# Patient Record
Sex: MALE | URBAN - METROPOLITAN AREA
[De-identification: names, ages, dates, MRNs, and addresses within clinical notes are randomized per-mention and may not be internally consistent; named-entity substitution may affect disease eponyms.]

---

## 2017-06-27 ENCOUNTER — APPOINTMENT (RX ONLY)
Dept: URBAN - METROPOLITAN AREA CLINIC 45 | Facility: CLINIC | Age: 62
Setting detail: DERMATOLOGY
End: 2017-06-27

## 2017-06-27 PROBLEM — E13.9 OTHER SPECIFIED DIABETES MELLITUS WITHOUT COMPLICATIONS: Status: ACTIVE | Noted: 2017-06-27

## 2017-06-27 PROBLEM — E03.9 HYPOTHYROIDISM, UNSPECIFIED: Status: ACTIVE | Noted: 2017-06-27

## 2017-06-27 PROBLEM — Z85.828 PERSONAL HISTORY OF OTHER MALIGNANT NEOPLASM OF SKIN: Status: ACTIVE | Noted: 2017-06-27

## 2017-06-27 PROBLEM — C44.42 SQUAMOUS CELL CARCINOMA OF SKIN OF SCALP AND NECK: Status: ACTIVE | Noted: 2017-06-27

## 2017-06-27 PROCEDURE — ? CONSULTATION FOR MOHS SURGERY

## 2017-06-27 PROCEDURE — 99203 OFFICE O/P NEW LOW 30 MIN: CPT

## 2017-06-27 NOTE — PROCEDURE: CONSULTATION FOR MOHS SURGERY
Body Location Override (Optional - Billing Will Still Be Based On Selected Body Map Location If Applicable): Top Scalp
Detail Level: Detailed
X Size Of Lesion In Cm (Optional): 0
Name Of The Referring Provider For Procedure: Dr. Call
Incorporate Mauc In Note: No

## 2017-06-27 NOTE — HPI: MOHS SURGERY CONSULTATION
Has The Cancer Been Biopsied Before?: has been previously biopsied
Who Is Your Referring Provider?: Dr. Call
When Was Your Biopsy?: 06/05/2017
Year Removed: 1900
Body Location Override (Optional): Top Scalp

## 2017-07-19 ENCOUNTER — APPOINTMENT (RX ONLY)
Dept: URBAN - METROPOLITAN AREA CLINIC 45 | Facility: CLINIC | Age: 62
Setting detail: DERMATOLOGY
End: 2017-07-19

## 2017-07-19 PROBLEM — C44.42 SQUAMOUS CELL CARCINOMA OF SKIN OF SCALP AND NECK: Status: ACTIVE | Noted: 2017-07-19

## 2017-07-19 PROCEDURE — 17311 MOHS 1 STAGE H/N/HF/G: CPT

## 2017-07-19 PROCEDURE — ? MOHS SURGERY

## 2017-07-19 PROCEDURE — 12032 INTMD RPR S/A/T/EXT 2.6-7.5: CPT

## 2017-07-19 PROCEDURE — ? PRESCRIPTION

## 2017-07-19 RX ORDER — CEPHALEXIN 500 MG/1
CAPSULE ORAL
Qty: 15 | Refills: 0 | Status: ERX | COMMUNITY
Start: 2017-07-19

## 2017-07-19 RX ADMIN — CEPHALEXIN: 500 CAPSULE ORAL at 17:26

## 2017-07-19 NOTE — PROCEDURE: MOHS SURGERY
Tarsorrhaphy Performed?: No
Helical Rim Advancement Flap Text: The defect edges were debeveled with a #15 blade scalpel.  Given the location of the defect and the proximity to free margins (helical rim) a double helical rim advancement flap was deemed most appropriate.  Using a sterile surgical marker, the appropriate advancement flaps were drawn incorporating the defect and placing the expected incisions between the helical rim and antihelix where possible.  The area thus outlined was incised through and through with a #15 scalpel blade.  With a skin hook and iris scissors, the flaps were gently and sharply undermined and freed up.
Show Surgical Defect Variables In The Stage Tabs: Yes
Z Plasty Text: The lesion was extirpated to the level of the fat with a #15 scalpel blade.  Given the location of the defect, shape of the defect and the proximity to free margins a Z-plasty was deemed most appropriate for repair.  Using a sterile surgical marker, the appropriate transposition arms of the Z-plasty were drawn incorporating the defect and placing the expected incisions within the relaxed skin tension lines where possible.    The area thus outlined was incised deep to adipose tissue with a #15 scalpel blade.  The skin margins were undermined to an appropriate distance in all directions utilizing iris scissors.  The opposing transposition arms were then transposed into place in opposite direction and anchored with interrupted buried subcutaneous sutures.
Anesthesia Volume In Cc: 9
Stage 10: Additional Anesthesia Volume In Cc: 0
Cartilage Graft Text: The defect edges were debeveled with a #15 scalpel blade.  Given the location of the defect, shape of the defect, the fact the defect involved a full thickness cartilage defect a cartilage graft was deemed most appropriate.  An appropriate donor site was identified, cleansed, and anesthetized. The cartilage graft was then harvested and transferred to the recipient site, oriented appropriately and then sutured into place.  The secondary defect was then repaired using a primary closure.
Intermediate Repair Preamble Text (Leave Blank If You Do Not Want): Undermining was performed with blunt dissection.
Posterior Auricular Interpolation Flap Text: A decision was made to reconstruct the defect utilizing an interpolation axial flap and a staged reconstruction.  A telfa template was made of the defect.  This telfa template was then used to outline the posterior auricular interpolation flap.  The donor area for the pedicle flap was then injected with anesthesia.  The flap was excised through the skin and subcutaneous tissue down to the layer of the underlying musculature.  The pedicle flap was carefully excised within this deep plane to maintain its blood supply.  The edges of the donor site were undermined.   The donor site was closed in a primary fashion.  The pedicle was then rotated into position and sutured.  Once the tube was sutured into place, adequate blood supply was confirmed with blanching and refill.  The pedicle was then wrapped with xeroform gauze and dressed appropriately with a telfa and gauze bandage to ensure continued blood supply and protect the attached pedicle.
Spiral Flap Text: The defect edges were debeveled with a #15 scalpel blade.  Given the location of the defect, shape of the defect and the proximity to free margins a spiral flap was deemed most appropriate.  Using a sterile surgical marker, an appropriate rotation flap was drawn incorporating the defect and placing the expected incisions within the relaxed skin tension lines where possible. The area thus outlined was incised deep to adipose tissue with a #15 scalpel blade.  The skin margins were undermined to an appropriate distance in all directions utilizing iris scissors.
Tarsorrhaphy Text: A tarsorrhaphy was performed using Frost sutures.
Consent (Nose)/Introductory Paragraph: The rationale for Mohs was explained to the patient and consent was obtained. The risks, benefits and alternatives to therapy were discussed in detail. Specifically, the risks of nasal deformity, changes in the flow of air through the nose, infection, scarring, bleeding, prolonged wound healing, incomplete removal, allergy to anesthesia, nerve injury and recurrence were addressed. Prior to the procedure, the treatment site was clearly identified and confirmed by the patient. All components of Universal Protocol/PAUSE Rule completed.
Referred To Otolaryngology For Closure Text (Leave Blank If You Do Not Want): After obtaining clear surgical margins the patient was sent to otolaryngology for surgical repair.  The patient understands they will receive post-surgical care and follow-up from the referring physician's office.
Repair Type: Intermediate Layered Repair
Dermal Autograft Text: The defect edges were debeveled with a #15 scalpel blade.  Given the location of the defect, shape of the defect and the proximity to free margins a dermal autograft was deemed most appropriate.  Using a sterile surgical marker, the primary defect shape was transferred to the donor site. The area thus outlined was incised deep to adipose tissue with a #15 scalpel blade.  The harvested graft was then trimmed of adipose and epidermal tissue until only dermis was left.  The skin graft was then placed in the primary defect and oriented appropriately.
Bilobed Flap Text: The defect edges were debeveled with a #15 scalpel blade.  Given the location of the defect and the proximity to free margins a bilobe flap was deemed most appropriate.  Using a sterile surgical marker, an appropriate bilobe flap drawn around the defect.    The area thus outlined was incised deep to adipose tissue with a #15 scalpel blade.  The skin margins were undermined to an appropriate distance in all directions utilizing iris scissors.
Island Pedicle Flap-Requiring Vessel Identification Text: The defect edges were debeveled with a #15 scalpel blade.  Given the location of the defect, shape of the defect and the proximity to free margins an island pedicle advancement flap was deemed most appropriate.  Using a sterile surgical marker, an appropriate advancement flap was drawn, based on the axial vessel mentioned above, incorporating the defect, outlining the appropriate donor tissue and placing the expected incisions within the relaxed skin tension lines where possible.    The area thus outlined was incised deep to adipose tissue with a #15 scalpel blade.  The skin margins were undermined to an appropriate distance in all directions around the primary defect and laterally outward around the island pedicle utilizing iris scissors.  There was minimal undermining beneath the pedicle flap.
Home Suture Removal Text: Patient was provided instructions on removing sutures and will remove their sutures at home.  If they have any questions or difficulties they will call the office.
Consent 1/Introductory Paragraph: The rationale for Mohs was explained to the patient and consent was obtained. The risks, benefits and alternatives to therapy were discussed in detail. Specifically, the risks of infection, scarring, bleeding, prolonged wound healing, incomplete removal, allergy to anesthesia, nerve injury and recurrence were addressed. Prior to the procedure, the treatment site was clearly identified and confirmed by the patient. All components of Universal Protocol/PAUSE Rule completed.
Ear Star Wedge Flap Text: The defect edges were debeveled with a #15 blade scalpel.  Given the location of the defect and the proximity to free margins (helical rim) an ear star wedge flap was deemed most appropriate.  Using a sterile surgical marker, the appropriate flap was drawn incorporating the defect and placing the expected incisions between the helical rim and antihelix where possible.  The area thus outlined was incised through and through with a #15 scalpel blade.
Stage 4: Additional Anesthesia Type: 1% lidocaine with epinephrine
Consent (Spinal Accessory)/Introductory Paragraph: The rationale for Mohs was explained to the patient and consent was obtained. The risks, benefits and alternatives to therapy were discussed in detail. Specifically, the risks of damage to the spinal accessory nerve, infection, scarring, bleeding, prolonged wound healing, incomplete removal, allergy to anesthesia, and recurrence were addressed. Prior to the procedure, the treatment site was clearly identified and confirmed by the patient. All components of Universal Protocol/PAUSE Rule completed.
Estimated Blood Loss (Cc): minimal
V-Y Plasty Text: The defect edges were debeveled with a #15 scalpel blade.  Given the location of the defect, shape of the defect and the proximity to free margins an V-Y advancement flap was deemed most appropriate.  Using a sterile surgical marker, an appropriate advancement flap was drawn incorporating the defect and placing the expected incisions within the relaxed skin tension lines where possible.    The area thus outlined was incised deep to adipose tissue with a #15 scalpel blade.  The skin margins were undermined to an appropriate distance in all directions utilizing iris scissors.
Closure 4 Information: This tab is for additional flaps and grafts above and beyond our usual structured repairs.  Please note if you enter information here it will not currently bill and you will need to add the billing information manually.
Rhombic Flap Text: The defect edges were debeveled with a #15 scalpel blade.  Given the location of the defect and the proximity to free margins a rhombic flap was deemed most appropriate.  Using a sterile surgical marker, an appropriate rhombic flap was drawn incorporating the defect.    The area thus outlined was incised deep to adipose tissue with a #15 scalpel blade.  The skin margins were undermined to an appropriate distance in all directions utilizing iris scissors.
Wound Care: Aquaphor
Epidermal Sutures: 4-0 Nylon
Simple / Intermediate / Complex Repair - Final Wound Length In Cm: 5
Closure 2 Information: This tab is for additional flaps and grafts, including complex repair and grafts and complex repair and flaps. You can also specify a different location for the additional defect, if the location is the same you do not need to select a new one. We will insert the automated text for the repair you select below just as we do for solitary flaps and grafts. Please note that at this time if you select a location with a different insurance zone you will need to override the ICD10 and CPT if appropriate.
Purse String (Intermediate) Text: Given the location of the defect and the characteristics of the surrounding skin a pursestring intermediate closure was deemed most appropriate.  Undermining was performed circumfirentially around the surgical defect.  A purstring suture was then placed and tightened.
Mohs Histo Method Verbiage: Each section was then chromacoded and processed in the Mohs lab using the Mohs protocol and submitted for frozen section.
Initial Size Of Lesion: 1.7
Trilobed Flap Text: The defect edges were debeveled with a #15 scalpel blade.  Given the location of the defect and the proximity to free margins a trilobed flap was deemed most appropriate.  Using a sterile surgical marker, an appropriate trilobed flap drawn around the defect.    The area thus outlined was incised deep to adipose tissue with a #15 scalpel blade.  The skin margins were undermined to an appropriate distance in all directions utilizing iris scissors.
Cheiloplasty (Complex) Text: A decision was made to reconstruct the defect with a  cheiloplasty.  The defect was undermined extensively.  Additional obicularis oris muscle was excised with a 15 blade scalpel.  The defect was converted into a full thickness wedge to facilite a better cosmetic result.  Small vessels were then tied off with 5-0 monocyrl. The obicularis oris, superficial fascia, adipose and dermis were then reapproximated.  After the deeper layers were approximated the epidermis was reapproximated with particular care given to realign the vermillion border.
O-L Flap Text: The defect edges were debeveled with a #15 scalpel blade.  Given the location of the defect, shape of the defect and the proximity to free margins an O-L flap was deemed most appropriate.  Using a sterile surgical marker, an appropriate advancement flap was drawn incorporating the defect and placing the expected incisions within the relaxed skin tension lines where possible.    The area thus outlined was incised deep to adipose tissue with a #15 scalpel blade.  The skin margins were undermined to an appropriate distance in all directions utilizing iris scissors.
Body Location Override (Optional - Billing Will Still Be Based On Selected Body Map Location If Applicable): top scalp
Consent (Ear)/Introductory Paragraph: The rationale for Mohs was explained to the patient and consent was obtained. The risks, benefits and alternatives to therapy were discussed in detail. Specifically, the risks of ear deformity, infection, scarring, bleeding, prolonged wound healing, incomplete removal, allergy to anesthesia, nerve injury and recurrence were addressed. Prior to the procedure, the treatment site was clearly identified and confirmed by the patient. All components of Universal Protocol/PAUSE Rule completed.
Ear Wedge Repair Text: A wedge excision was completed by carrying down an excision through the full thickness of the ear and cartilage with an inward facing Burow's triangle. The wound was then closed in a layered fashion.
Alar Island Pedicle Flap Text: The defect edges were debeveled with a #15 scalpel blade.  Given the location of the defect, shape of the defect and the proximity to the alar rim an island pedicle advancement flap was deemed most appropriate.  Using a sterile surgical marker, an appropriate advancement flap was drawn incorporating the defect, outlining the appropriate donor tissue and placing the expected incisions within the nasal ala running parallel to the alar rim. The area thus outlined was incised with a #15 scalpel blade.  The skin margins were undermined minimally to an appropriate distance in all directions around the primary defect and laterally outward around the island pedicle utilizing iris scissors.  There was minimal undermining beneath the pedicle flap.
V-Y Flap Text: The defect edges were debeveled with a #15 scalpel blade.  Given the location of the defect, shape of the defect and the proximity to free margins a V-Y flap was deemed most appropriate.  Using a sterile surgical marker, an appropriate advancement flap was drawn incorporating the defect and placing the expected incisions within the relaxed skin tension lines where possible.    The area thus outlined was incised deep to adipose tissue with a #15 scalpel blade.  The skin margins were undermined to an appropriate distance in all directions utilizing iris scissors.
Complex Repair And Graft Additional Text (Will Appearing After The Standard Complex Repair Text): The complex repair was not sufficient to completely close the primary defect. The remaining additional defect was repaired with the graft mentioned below.
Melolabial Interpolation Flap Text: A decision was made to reconstruct the defect utilizing an interpolation axial flap and a staged reconstruction.  A telfa template was made of the defect.  This telfa template was then used to outline the melolabial interpolation flap.  The donor area for the pedicle flap was then injected with anesthesia.  The flap was excised through the skin and subcutaneous tissue down to the layer of the underlying musculature.  The pedicle flap was carefully excised within this deep plane to maintain its blood supply.  The edges of the donor site were undermined.   The donor site was closed in a primary fashion.  The pedicle was then rotated into position and sutured.  Once the tube was sutured into place, adequate blood supply was confirmed with blanching and refill.  The pedicle was then wrapped with xeroform gauze and dressed appropriately with a telfa and gauze bandage to ensure continued blood supply and protect the attached pedicle.
Cheek-To-Nose Interpolation Flap Text: A decision was made to reconstruct the defect utilizing an interpolation axial flap and a staged reconstruction.  A telfa template was made of the defect.  This telfa template was then used to outline the Cheek-To-Nose Interpolation flap.  The donor area for the pedicle flap was then injected with anesthesia.  The flap was excised through the skin and subcutaneous tissue down to the layer of the underlying musculature.  The interpolation flap was carefully excised within this deep plane to maintain its blood supply.  The edges of the donor site were undermined.   The donor site was closed in a primary fashion.  The pedicle was then rotated into position and sutured.  Once the tube was sutured into place, adequate blood supply was confirmed with blanching and refill.  The pedicle was then wrapped with xeroform gauze and dressed appropriately with a telfa and gauze bandage to ensure continued blood supply and protect the attached pedicle.
Mohs Method Verbiage: A beveled incision following the standard Mohs approach was done and the specimen was harvested as a microscopic controlled layer.
Area M Indication Text: Tumors in this location are included in Area M (cheek, forehead, scalp, neck, jawline and pretibial skin).  Mohs surgery is indicated for tumors in these anatomic locations.
Advancement Flap (Single) Text: The defect edges were debeveled with a #15 scalpel blade.  Given the location of the defect and the proximity to free margins a single advancement flap was deemed most appropriate.  Using a sterile surgical marker, an appropriate advancement flap was drawn incorporating the defect and placing the expected incisions within the relaxed skin tension lines where possible.    The area thus outlined was incised deep to adipose tissue with a #15 scalpel blade.  The skin margins were undermined to an appropriate distance in all directions utilizing iris scissors.
Tissue Cultured Epidermal Autograft Text: The defect edges were debeveled with a #15 scalpel blade.  Given the location of the defect, shape of the defect and the proximity to free margins a tissue cultured epidermal autograft was deemed most appropriate.  The graft was then trimmed to fit the size of the defect.  The graft was then placed in the primary defect and oriented appropriately.
Lazy S Complex Repair Preamble Text (Leave Blank If You Do Not Want): Extensive wide undermining was performed.
Partial Purse String (Intermediate) Text: Given the location of the defect and the characteristics of the surrounding skin an intermediate purse string closure was deemed most appropriate.  Undermining was performed circumfirentially around the surgical defect.  A purse string suture was then placed and tightened. Wound tension only allowed a partial closure of the circular defect.
Melolabial Transposition Flap Text: The defect edges were debeveled with a #15 scalpel blade.  Given the location of the defect and the proximity to free margins a melolabial flap was deemed most appropriate.  Using a sterile surgical marker, an appropriate melolabial transposition flap was drawn incorporating the defect.    The area thus outlined was incised deep to adipose tissue with a #15 scalpel blade.  The skin margins were undermined to an appropriate distance in all directions utilizing iris scissors.
Epidermal Closure: running horizontal mattress
Composite Graft Text: The defect edges were debeveled with a #15 scalpel blade.  Given the location of the defect, shape of the defect, the proximity to free margins and the fact the defect was full thickness a composite graft was deemed most appropriate.  The defect was outline and then transferred to the donor site.  A full thickness graft was then excised from the donor site. The graft was then placed in the primary defect, oriented appropriately and then sutured into place.  The secondary defect was then repaired using a primary closure.
Referred To Mid-Level For Closure Text (Leave Blank If You Do Not Want): After obtaining clear surgical margins the patient was sent to a mid-level provider for surgical repair.  The patient understands they will receive post-surgical care and follow-up from the mid-level provider.
Consent 3/Introductory Paragraph: I gave the patient a chance to ask questions they had about the procedure.  Following this I explained the Mohs procedure and consent was obtained. The risks, benefits and alternatives to therapy were discussed in detail. Specifically, the risks of infection, scarring, bleeding, prolonged wound healing, incomplete removal, allergy to anesthesia, nerve injury and recurrence were addressed. Prior to the procedure, the treatment site was clearly identified and confirmed by the patient. All components of Universal Protocol/PAUSE Rule completed.
Closure 3 Information: This tab is for additional flaps and grafts above and beyond our usual structured repairs.  Please note if you enter information here it will not currently bill and you will need to add the billing information manually.
O-Z Plasty Text: The defect edges were debeveled with a #15 scalpel blade.  Given the location of the defect, shape of the defect and the proximity to free margins an O-Z plasty (double transposition flap) was deemed most appropriate.  Using a sterile surgical marker, the appropriate transposition flaps were drawn incorporating the defect and placing the expected incisions within the relaxed skin tension lines where possible.    The area thus outlined was incised deep to adipose tissue with a #15 scalpel blade.  The skin margins were undermined to an appropriate distance in all directions utilizing iris scissors.  Hemostasis was achieved with electrocautery.  The flaps were then transposed into place, one clockwise and the other counterclockwise, and anchored with interrupted buried subcutaneous sutures.
Inflammation Suggestive Of Cancer Camouflage Histology Text: There was a dense lymphocytic infiltrate which prevented adequate histologic evaluation of adjacent structures.
Medical Necessity Statement: Based on my medical judgement, Mohs surgery is the most appropriate treatment for this cancer compared to other treatments.
Surgeon: Dr. Mcmillan
Consent (Lip)/Introductory Paragraph: The rationale for Mohs was explained to the patient and consent was obtained. The risks, benefits and alternatives to therapy were discussed in detail. Specifically, the risks of lip deformity, changes in the oral aperture, infection, scarring, bleeding, prolonged wound healing, incomplete removal, allergy to anesthesia, nerve injury and recurrence were addressed. Prior to the procedure, the treatment site was clearly identified and confirmed by the patient. All components of Universal Protocol/PAUSE Rule completed.
A-T Advancement Flap Text: The defect edges were debeveled with a #15 scalpel blade.  Given the location of the defect, shape of the defect and the proximity to free margins an A-T advancement flap was deemed most appropriate.  Using a sterile surgical marker, an appropriate advancement flap was drawn incorporating the defect and placing the expected incisions within the relaxed skin tension lines where possible.    The area thus outlined was incised deep to adipose tissue with a #15 scalpel blade.  The skin margins were undermined to an appropriate distance in all directions utilizing iris scissors.
W Plasty Text: The lesion was extirpated to the level of the fat with a #15 scalpel blade.  Given the location of the defect, shape of the defect and the proximity to free margins a W-plasty was deemed most appropriate for repair.  Using a sterile surgical marker, the appropriate transposition arms of the W-plasty were drawn incorporating the defect and placing the expected incisions within the relaxed skin tension lines where possible.    The area thus outlined was incised deep to adipose tissue with a #15 scalpel blade.  The skin margins were undermined to an appropriate distance in all directions utilizing iris scissors.  The opposing transposition arms were then transposed into place in opposite direction and anchored with interrupted buried subcutaneous sutures.
Unna Boot Text: An Unna boot was placed to help immobilize the limb and facilitate more rapid healing.
Island Pedicle Flap Text: The defect edges were debeveled with a #15 scalpel blade.  Given the location of the defect, shape of the defect and the proximity to free margins an island pedicle advancement flap was deemed most appropriate.  Using a sterile surgical marker, an appropriate advancement flap was drawn incorporating the defect, outlining the appropriate donor tissue and placing the expected incisions within the relaxed skin tension lines where possible.    The area thus outlined was incised deep to adipose tissue with a #15 scalpel blade.  The skin margins were undermined to an appropriate distance in all directions around the primary defect and laterally outward around the island pedicle utilizing iris scissors.  There was minimal undermining beneath the pedicle flap.
Burow's Advancement Flap Text: The defect edges were debeveled with a #15 scalpel blade.  Given the location of the defect and the proximity to free margins a Burow's advancement flap was deemed most appropriate.  Using a sterile surgical marker, the appropriate advancement flap was drawn incorporating the defect and placing the expected incisions within the relaxed skin tension lines where possible.    The area thus outlined was incised deep to adipose tissue with a #15 scalpel blade.  The skin margins were undermined to an appropriate distance in all directions utilizing iris scissors.
Mucosal Advancement Flap Text: Given the location of the defect, shape of the defect and the proximity to free margins a mucosal advancement flap was deemed most appropriate. Incisions were made with a 15 blade scalpel in the appropriate fashion along the cutaneous vermillion border and the mucosal lip. The remaining actinically damaged mucosal tissue was excised.  The mucosal advancement flap was then elevated to the gingival sulcus with care taken to preserve the neurovascular structures and advanced into the primary defect. Care was taken to ensure that precise realignment of the vermillion border was achieved.
Number Of Stages: 1
No Repair - Repaired With Adjacent Surgical Defect Text (Leave Blank If You Do Not Want): After obtaining clear surgical margins the defect was repaired concurrently with another surgical defect which was in close approximation.
Transposition Flap Text: The defect edges were debeveled with a #15 scalpel blade.  Given the location of the defect and the proximity to free margins a transposition flap was deemed most appropriate.  Using a sterile surgical marker, an appropriate transposition flap was drawn incorporating the defect.    The area thus outlined was incised deep to adipose tissue with a #15 scalpel blade.  The skin margins were undermined to an appropriate distance in all directions utilizing iris scissors.
Bcc Histology Text: There were numerous aggregates of basaloid cells.
Bilateral Helical Rim Advancement Flap Text: The defect edges were debeveled with a #15 blade scalpel.  Given the location of the defect and the proximity to free margins (helical rim) a bilateral helical rim advancement flap was deemed most appropriate.  Using a sterile surgical marker, the appropriate advancement flaps were drawn incorporating the defect and placing the expected incisions between the helical rim and antihelix where possible.  The area thus outlined was incised through and through with a #15 scalpel blade.  With a skin hook and iris scissors, the flaps were gently and sharply undermined and freed up.
Star Wedge Flap Text: The defect edges were debeveled with a #15 scalpel blade.  Given the location of the defect, shape of the defect and the proximity to free margins a star wedge flap was deemed most appropriate.  Using a sterile surgical marker, an appropriate rotation flap was drawn incorporating the defect and placing the expected incisions within the relaxed skin tension lines where possible. The area thus outlined was incised deep to adipose tissue with a #15 scalpel blade.  The skin margins were undermined to an appropriate distance in all directions utilizing iris scissors.
Repair Anesthesia Method: local infiltration
Alternatives Discussed Intro (Do Not Add Period): I discussed alternative treatments to Mohs surgery and specifically discussed the risks and benefits of
O-T Advancement Flap Text: The defect edges were debeveled with a #15 scalpel blade.  Given the location of the defect, shape of the defect and the proximity to free margins an O-T advancement flap was deemed most appropriate.  Using a sterile surgical marker, an appropriate advancement flap was drawn incorporating the defect and placing the expected incisions within the relaxed skin tension lines where possible.    The area thus outlined was incised deep to adipose tissue with a #15 scalpel blade.  The skin margins were undermined to an appropriate distance in all directions utilizing iris scissors.
Rotation Flap Text: The defect edges were debeveled with a #15 scalpel blade.  Given the location of the defect, shape of the defect and the proximity to free margins a rotation flap was deemed most appropriate.  Using a sterile surgical marker, an appropriate rotation flap was drawn incorporating the defect and placing the expected incisions within the relaxed skin tension lines where possible.    The area thus outlined was incised deep to adipose tissue with a #15 scalpel blade.  The skin margins were undermined to an appropriate distance in all directions utilizing iris scissors.
Graft Donor Site Bandage (Optional-Leave Blank If You Don't Want In Note): Steri-strips and a pressure bandage were applied to the donor site.
Referred To Plastics For Closure Text (Leave Blank If You Do Not Want): After obtaining clear surgical margins the patient was sent to Dr. Denny for surgical repair.
Subsequent Stages Histo Method Verbiage: Using a similar technique to that described above, a thin layer of tissue was removed from all areas where tumor was visible on the previous stage.  The tissue was again oriented, mapped, dyed, and processed as above.
Referred To Oculoplastics For Closure Text (Leave Blank If You Do Not Want): After obtaining clear surgical margins the patient was sent to oculoplastics for surgical repair.  The patient understands they will receive post-surgical care and follow-up from the referring physician's office.
Purse String (Simple) Text: Given the location of the defect and the characteristics of the surrounding skin a pursestring closure was deemed most appropriate.  Undermining was performed circumfirentially around the surgical defect.  A purstring suture was then placed and tightened.
Paramedian Forehead Flap Text: A decision was made to reconstruct the defect utilizing an interpolation axial flap and a staged reconstruction.  A telfa template was made of the defect.  This telfa template was then used to outline the paramedian forehead pedicle flap.  The donor area for the pedicle flap was then injected with anesthesia.  The flap was excised through the skin and subcutaneous tissue down to the layer of the underlying musculature.  The pedicle flap was carefully excised within this deep plane to maintain its blood supply.  The edges of the donor site were undermined.   The donor site was closed in a primary fashion.  The pedicle was then rotated into position and sutured.  Once the tube was sutured into place, adequate blood supply was confirmed with blanching and refill.  The pedicle was then wrapped with xeroform gauze and dressed appropriately with a telfa and gauze bandage to ensure continued blood supply and protect the attached pedicle.
X Size Of Lesion In Cm (Optional): 1.2
Cheiloplasty (Less Than 50%) Text: A decision was made to reconstruct the defect with a  cheiloplasty.  The defect was undermined extensively.  Additional obicularis oris muscle was excised with a 15 blade scalpel.  The defect was converted into a full thickness wedge, of less than 50% of the vertical height of the lip, to facilite a better cosmetic result.  Small vessels were then tied off with 5-0 monocyrl. The obicularis oris, superficial fascia, adipose and dermis were then reapproximated.  After the deeper layers were approximated the epidermis was reapproximated with particular care given to realign the vermillion border.
Keystone Flap Text: The defect edges were debeveled with a #15 scalpel blade.  Given the location of the defect, shape of the defect a keystone flap was deemed most appropriate.  Using a sterile surgical marker, an appropriate keystone flap was drawn incorporating the defect, outlining the appropriate donor tissue and placing the expected incisions within the relaxed skin tension lines where possible. The area thus outlined was incised deep to adipose tissue with a #15 scalpel blade.  The skin margins were undermined to an appropriate distance in all directions around the primary defect and laterally outward around the flap utilizing iris scissors.
Detail Level: Detailed
Mohs Rapid Report Verbiage: The area of clinically evident tumor was marked with skin marking ink and appropriately hatched.  The initial incision was made following the Mohs approach through the skin.  The specimen was taken to the lab, divided into the necessary number of pieces, chromacoded and processed according to the Mohs protocol.  This was repeated in successive stages until a tumor free defect was achieved.
Modified Advancement Flap Text: The defect edges were debeveled with a #15 scalpel blade.  Given the location of the defect, shape of the defect and the proximity to free margins a modified advancement flap was deemed most appropriate.  Using a sterile surgical marker, an appropriate advancement flap was drawn incorporating the defect and placing the expected incisions within the relaxed skin tension lines where possible.    The area thus outlined was incised deep to adipose tissue with a #15 scalpel blade.  The skin margins were undermined to an appropriate distance in all directions utilizing iris scissors.
Consent (Marginal Mandibular)/Introductory Paragraph: The rationale for Mohs was explained to the patient and consent was obtained. The risks, benefits and alternatives to therapy were discussed in detail. Specifically, the risks of damage to the marginal mandibular branch of the facial nerve, infection, scarring, bleeding, prolonged wound healing, incomplete removal, allergy to anesthesia, and recurrence were addressed. Prior to the procedure, the treatment site was clearly identified and confirmed by the patient. All components of Universal Protocol/PAUSE Rule completed.
Repair Hemostasis (Optional): Electrocautery
Suture Removal: 14 days
Bcc Infiltrative Histology Text: There were numerous aggregates of basaloid cells demonstrating an infiltrative pattern.
Split-Thickness Skin Graft Text: The defect edges were debeveled with a #15 scalpel blade.  Given the location of the defect, shape of the defect and the proximity to free margins a split thickness skin graft was deemed most appropriate.  Using a sterile surgical marker, the primary defect shape was transferred to the donor site. The split thickness graft was then harvested.  The skin graft was then placed in the primary defect and oriented appropriately.
Complex Repair And Flap Additional Text (Will Appearing After The Standard Complex Repair Text): The complex repair was not sufficient to completely close the primary defect. The remaining additional defect was repaired with the flap mentioned below.
Surgeon/Pathologist Verbiage (Will Incorporate Name Of Surgeon From Intro If Not Blank): operated in two distinct and integrated capacities as the surgeon and pathologist.
Epidermal Closure Graft Donor Site (Optional): simple interrupted
Same Histology In Subsequent Stages Text: The pattern and morphology of the tumor is as described in the first stage.
Repair Performed By Another Provider Text (Leave Blank If You Do Not Want): After obtaining clear surgical margins the defect was repaired by another provider.
Mastoid Interpolation Flap Text: A decision was made to reconstruct the defect utilizing an interpolation axial flap and a staged reconstruction.  A telfa template was made of the defect.  This telfa template was then used to outline the mastoid interpolation flap.  The donor area for the pedicle flap was then injected with anesthesia.  The flap was excised through the skin and subcutaneous tissue down to the layer of the underlying musculature.  The pedicle flap was carefully excised within this deep plane to maintain its blood supply.  The edges of the donor site were undermined.   The donor site was closed in a primary fashion.  The pedicle was then rotated into position and sutured.  Once the tube was sutured into place, adequate blood supply was confirmed with blanching and refill.  The pedicle was then wrapped with xeroform gauze and dressed appropriately with a telfa and gauze bandage to ensure continued blood supply and protect the attached pedicle.
Post-Care Instructions: Patient instructed to leave dressing alone and dry for 48 hours. Written and verbal instructions provided for the patient along with Dr. Mcmillan's cell phone number.
Area L Indication Text: Tumors in this location are included in Area L (trunk and extremities).  Mohs surgery is indicated for larger tumors, or tumors with aggressive histologic features, in these anatomic locations.
Anesthesia Type: 0.5% lidocaine with 1:200,000 epinephrine
Postop Diagnosis: same
Bilobed Transposition Flap Text: The defect edges were debeveled with a #15 scalpel blade.  Given the location of the defect and the proximity to free margins a bilobed transposition flap was deemed most appropriate.  Using a sterile surgical marker, an appropriate bilobe flap drawn around the defect.    The area thus outlined was incised deep to adipose tissue with a #15 scalpel blade.  The skin margins were undermined to an appropriate distance in all directions utilizing iris scissors.
Consent Type: Consent 1 (Standard)
Cheek Interpolation Flap Text: A decision was made to reconstruct the defect utilizing an interpolation axial flap and a staged reconstruction.  A telfa template was made of the defect.  This telfa template was then used to outline the Cheek Interpolation flap.  The donor area for the pedicle flap was then injected with anesthesia.  The flap was excised through the skin and subcutaneous tissue down to the layer of the underlying musculature.  The interpolation flap was carefully excised within this deep plane to maintain its blood supply.  The edges of the donor site were undermined.   The donor site was closed in a primary fashion.  The pedicle was then rotated into position and sutured.  Once the tube was sutured into place, adequate blood supply was confirmed with blanching and refill.  The pedicle was then wrapped with xeroform gauze and dressed appropriately with a telfa and gauze bandage to ensure continued blood supply and protect the attached pedicle.
Deep Sutures: 3-0 Vicryl
Advancement-Rotation Flap Text: The defect edges were debeveled with a #15 scalpel blade.  Given the location of the defect, shape of the defect and the proximity to free margins an advancement-rotation flap was deemed most appropriate.  Using a sterile surgical marker, an appropriate flap was drawn incorporating the defect and placing the expected incisions within the relaxed skin tension lines where possible. The area thus outlined was incised deep to adipose tissue with a #15 scalpel blade.  The skin margins were undermined to an appropriate distance in all directions utilizing iris scissors.
Secondary Intention Text (Leave Blank If You Do Not Want): The defect will heal with secondary intention.
Xenograft Text: The defect edges were debeveled with a #15 scalpel blade.  Given the location of the defect, shape of the defect and the proximity to free margins a xenograft was deemed most appropriate.  The graft was then trimmed to fit the size of the defect.  The graft was then placed in the primary defect and oriented appropriately.
Crescentic Advancement Flap Text: The defect edges were debeveled with a #15 scalpel blade.  Given the location of the defect and the proximity to free margins a crescentic advancement flap was deemed most appropriate.  Using a sterile surgical marker, the appropriate advancement flap was drawn incorporating the defect and placing the expected incisions within the relaxed skin tension lines where possible.    The area thus outlined was incised deep to adipose tissue with a #15 scalpel blade.  The skin margins were undermined to an appropriate distance in all directions utilizing iris scissors.
Area H Indication Text: Tumors in this location are included in Area H (eyelids, eyebrows, nose, lips, chin, ear, pre-auricular, post-auricular, temple, genitalia, hands, feet, ankles and areola).  Tissue conservation is critical in these anatomic locations.
Dressing: pressure dressing
Double Island Pedicle Flap Text: The defect edges were debeveled with a #15 scalpel blade.  Given the location of the defect, shape of the defect and the proximity to free margins a double island pedicle advancement flap was deemed most appropriate.  Using a sterile surgical marker, an appropriate advancement flap was drawn incorporating the defect, outlining the appropriate donor tissue and placing the expected incisions within the relaxed skin tension lines where possible.    The area thus outlined was incised deep to adipose tissue with a #15 scalpel blade.  The skin margins were undermined to an appropriate distance in all directions around the primary defect and laterally outward around the island pedicle utilizing iris scissors.  There was minimal undermining beneath the pedicle flap.
Muscle Hinge Flap Text: The defect edges were debeveled with a #15 scalpel blade.  Given the size, depth and location of the defect and the proximity to free margins a muscle hinge flap was deemed most appropriate.  Using a sterile surgical marker, an appropriate hinge flap was drawn incorporating the defect. The area thus outlined was incised with a #15 scalpel blade.  The skin margins were undermined to an appropriate distance in all directions utilizing iris scissors.
Epidermal Autograft Text: The defect edges were debeveled with a #15 scalpel blade.  Given the location of the defect, shape of the defect and the proximity to free margins an epidermal autograft was deemed most appropriate.  Using a sterile surgical marker, the primary defect shape was transferred to the donor site. The epidermal graft was then harvested.  The skin graft was then placed in the primary defect and oriented appropriately.
Mauc Instructions: By selecting yes to the question below the MAUC number will be added into the note.  This will be calculated automatically based on the diagnosis chosen, the size entered, the body zone selected (H,M,L) and the specific indications you chose. You will also have the option to override the Mohs AUC if you disagree with the automatically calculated number and this option is found in the Case Summary tab.
S Plasty Text: Given the location and shape of the defect, and the orientation of relaxed skin tension lines, an S-plasty was deemed most appropriate for repair.  Using a sterile surgical marker, the appropriate outline of the S-plasty was drawn, incorporating the defect and placing the expected incisions within the relaxed skin tension lines where possible.  The area thus outlined was incised deep to adipose tissue with a #15 scalpel blade.  The skin margins were undermined to an appropriate distance in all directions utilizing iris scissors. The skin flaps were advanced over the defect.  The opposing margins were then approximated with interrupted buried subcutaneous sutures.
Consent (Near Eyelid Margin)/Introductory Paragraph: The rationale for Mohs was explained to the patient and consent was obtained. The risks, benefits and alternatives to therapy were discussed in detail. Specifically, the risks of ectropion or eyelid deformity, infection, scarring, bleeding, prolonged wound healing, incomplete removal, allergy to anesthesia, nerve injury and recurrence were addressed. Prior to the procedure, the treatment site was clearly identified and confirmed by the patient. All components of Universal Protocol/PAUSE Rule completed.
Consent 2/Introductory Paragraph: Mohs surgery was explained to the patient and consent was obtained. The risks, benefits and alternatives to therapy were discussed in detail. Specifically, the risks of infection, scarring, bleeding, prolonged wound healing, incomplete removal, allergy to anesthesia, nerve injury and recurrence were addressed. Prior to the procedure, the treatment site was clearly identified and confirmed by the patient. All components of Universal Protocol/PAUSE Rule completed.
Hatchet Flap Text: The defect edges were debeveled with a #15 scalpel blade.  Given the location of the defect, shape of the defect and the proximity to free margins a hatchet flap was deemed most appropriate.  Using a sterile surgical marker, an appropriate hatchet flap was drawn incorporating the defect and placing the expected incisions within the relaxed skin tension lines where possible.    The area thus outlined was incised deep to adipose tissue with a #15 scalpel blade.  The skin margins were undermined to an appropriate distance in all directions utilizing iris scissors.
Dorsal Nasal Flap Text: The defect edges were debeveled with a #15 scalpel blade.  Given the location of the defect and the proximity to free margins a dorsal nasal flap was deemed most appropriate.  Using a sterile surgical marker, an appropriate dorsal nasal flap was drawn around the defect.    The area thus outlined was incised deep to adipose tissue with a #15 scalpel blade.  The skin margins were undermined to an appropriate distance in all directions utilizing iris scissors.
Partial Purse String (Simple) Text: Given the location of the defect and the characteristics of the surrounding skin a simple purse string closure was deemed most appropriate.  Undermining was performed circumfirentially around the surgical defect.  A purse string suture was then placed and tightened. Wound tension only allowed a partial closure of the circular defect.
Consent (Scalp)/Introductory Paragraph: The rationale for Mohs was explained to the patient and consent was obtained. The risks, benefits and alternatives to therapy were discussed in detail. Specifically, the risks of changes in hair growth pattern secondary to repair, infection, scarring, bleeding, prolonged wound healing, incomplete removal, allergy to anesthesia, nerve injury and recurrence were addressed. Prior to the procedure, the treatment site was clearly identified and confirmed by the patient. All components of Universal Protocol/PAUSE Rule completed.
No Residual Tumor Seen Histology Text: There were no malignant cells seen in the sections examined.
Bi-Rhombic Flap Text: The defect edges were debeveled with a #15 scalpel blade.  Given the location of the defect and the proximity to free margins a bi-rhombic flap was deemed most appropriate.  Using a sterile surgical marker, an appropriate rhombic flap was drawn incorporating the defect. The area thus outlined was incised deep to adipose tissue with a #15 scalpel blade.  The skin margins were undermined to an appropriate distance in all directions utilizing iris scissors.
Full Thickness Lip Wedge Repair (Flap) Text: Given the location of the defect and the proximity to free margins a full thickness wedge repair was deemed most appropriate.  Using a sterile surgical marker, the appropriate repair was drawn incorporating the defect and placing the expected incisions perpendicular to the vermillion border.  The vermillion border was also meticulously outlined to ensure appropriate reapproximation during the repair.  The area thus outlined was incised through and through with a #15 scalpel blade.  The muscularis and dermis were reaproximated with deep sutures following hemostasis. Care was taken to realign the vermillion border before proceeding with the superficial closure.  Once the vermillion was realigned the superfical and mucosal closure was finished.
Skin Substitute Text: The defect edges were debeveled with a #15 scalpel blade.  Given the location of the defect, shape of the defect and the proximity to free margins a skin substitute graft was deemed most appropriate.  The graft material was trimmed to fit the size of the defect. The graft was then placed in the primary defect and oriented appropriately.
Eye Protection Verbiage: Before proceeding with the stage, a plastic scleral shield was inserted. The globe was anesthetized with a few drops of 1% lidocaine with 1:100,000 epinephrine. Then, an appropriate sized scleral shield was chosen and coated with lacrilube ointment. The shield was gently inserted and left in place for the duration of each stage. After the stage was completed, the shield was gently removed.
Referred To Asc For Closure Text (Leave Blank If You Do Not Want): After obtaining clear surgical margins the patient was sent to an ASC for surgical repair.  The patient understands they will receive post-surgical care and follow-up from the ASC physician.
Primary Defect Length In Cm (Final Defect Size - Required For Flaps/Grafts): 2.1
Island Pedicle Flap With Canthal Suspension Text: The defect edges were debeveled with a #15 scalpel blade.  Given the location of the defect, shape of the defect and the proximity to free margins an island pedicle advancement flap was deemed most appropriate.  Using a sterile surgical marker, an appropriate advancement flap was drawn incorporating the defect, outlining the appropriate donor tissue and placing the expected incisions within the relaxed skin tension lines where possible. The area thus outlined was incised deep to adipose tissue with a #15 scalpel blade.  The skin margins were undermined to an appropriate distance in all directions around the primary defect and laterally outward around the island pedicle utilizing iris scissors.  There was minimal undermining beneath the pedicle flap. A suspension suture was placed in the canthal tendon to prevent tension and prevent ectropion.
Consent (Temporal Branch)/Introductory Paragraph: The rationale for Mohs was explained to the patient and consent was obtained. The risks, benefits and alternatives to therapy were discussed in detail. Specifically, the risks of damage to the temporal branch of the facial nerve, infection, scarring, bleeding, prolonged wound healing, incomplete removal, allergy to anesthesia, and recurrence were addressed. Prior to the procedure, the treatment site was clearly identified and confirmed by the patient. All components of Universal Protocol/PAUSE Rule completed.
Interpolation Flap Text: A decision was made to reconstruct the defect utilizing an interpolation axial flap and a staged reconstruction.  A telfa template was made of the defect.  This telfa template was then used to outline the interpolation flap.  The donor area for the pedicle flap was then injected with anesthesia.  The flap was excised through the skin and subcutaneous tissue down to the layer of the underlying musculature.  The interpolation flap was carefully excised within this deep plane to maintain its blood supply.  The edges of the donor site were undermined.   The donor site was closed in a primary fashion.  The pedicle was then rotated into position and sutured.  Once the tube was sutured into place, adequate blood supply was confirmed with blanching and refill.  The pedicle was then wrapped with xeroform gauze and dressed appropriately with a telfa and gauze bandage to ensure continued blood supply and protect the attached pedicle.
O-T Plasty Text: The defect edges were debeveled with a #15 scalpel blade.  Given the location of the defect, shape of the defect and the proximity to free margins an O-T plasty was deemed most appropriate.  Using a sterile surgical marker, an appropriate O-T plasty was drawn incorporating the defect and placing the expected incisions within the relaxed skin tension lines where possible.    The area thus outlined was incised deep to adipose tissue with a #15 scalpel blade.  The skin margins were undermined to an appropriate distance in all directions utilizing iris scissors.
Advancement Flap (Double) Text: The defect edges were debeveled with a #15 scalpel blade.  Given the location of the defect and the proximity to free margins a double advancement flap was deemed most appropriate.  Using a sterile surgical marker, the appropriate advancement flaps were drawn incorporating the defect and placing the expected incisions within the relaxed skin tension lines where possible.    The area thus outlined was incised deep to adipose tissue with a #15 scalpel blade.  The skin margins were undermined to an appropriate distance in all directions utilizing iris scissors.
Manual Repair Warning Statement: We plan on removing the manually selected variable below in favor of our much easier automatic structured text blocks found in the previous tab. We decided to do this to help make the flow better and give you the full power of structured data. Manual selection is never going to be ideal in our platform and I would encourage you to avoid using manual selection from this point on, especially since I will be sunsetting this feature. It is important that you do one of two things with the customized text below. First, you can save all of the text in a word file so you can have it for future reference. Second, transfer the text to the appropriate area in the Library tab. Lastly, if there is a flap or graft type which we do not have you need to let us know right away so I can add it in before the variable is hidden. No need to panic, we plan to give you roughly 6 months to make the change.
H Plasty Text: Given the location of the defect, shape of the defect and the proximity to free margins a H-plasty was deemed most appropriate for repair.  Using a sterile surgical marker, the appropriate advancement arms of the H-plasty were drawn incorporating the defect and placing the expected incisions within the relaxed skin tension lines where possible. The area thus outlined was incised deep to adipose tissue with a #15 scalpel blade. The skin margins were undermined to an appropriate distance in all directions utilizing iris scissors.  The opposing advancement arms were then advanced into place in opposite direction and anchored with interrupted buried subcutaneous sutures.
Localized Dermabrasion Text: The patient was draped in routine manner.  Localized dermabrasion using 3 x 17 mm wire brush was performed in routine manner to papillary dermis. This spot dermabrasion is being performed to complete skin cancer reconstruction. It also will eliminate the other sun damaged precancerous cells that are known to be part of the regional effect of a lifetime's worth of sun exposure. This localized dermabrasion is therapeutic and should not be considered cosmetic in any regard.

## 2017-08-02 ENCOUNTER — APPOINTMENT (RX ONLY)
Dept: URBAN - METROPOLITAN AREA CLINIC 45 | Facility: CLINIC | Age: 62
Setting detail: DERMATOLOGY
End: 2017-08-02

## 2017-08-02 DIAGNOSIS — Z48.02 ENCOUNTER FOR REMOVAL OF SUTURES: ICD-10-CM

## 2017-08-02 PROCEDURE — 99024 POSTOP FOLLOW-UP VISIT: CPT

## 2017-08-02 PROCEDURE — ? SUTURE REMOVAL (GLOBAL PERIOD)

## 2017-08-02 ASSESSMENT — LOCATION DETAILED DESCRIPTION DERM: LOCATION DETAILED: MID-FRONTAL SCALP

## 2017-08-02 ASSESSMENT — LOCATION SIMPLE DESCRIPTION DERM: LOCATION SIMPLE: ANTERIOR SCALP

## 2017-08-02 ASSESSMENT — LOCATION ZONE DERM: LOCATION ZONE: SCALP

## 2017-08-02 ASSESSMENT — PAIN INTENSITY VAS: HOW INTENSE IS YOUR PAIN 0 BEING NO PAIN, 10 BEING THE MOST SEVERE PAIN POSSIBLE?: NO PAIN

## 2017-08-02 NOTE — PROCEDURE: SUTURE REMOVAL (GLOBAL PERIOD)
Body Location Override (Optional - Billing Will Still Be Based On Selected Body Map Location If Applicable): top scalp
Detail Level: Detailed
Add 36689 Cpt? (Important Note: In 2017 The Use Of 79249 Is Being Tracked By Cms To Determine Future Global Period Reimbursement For Global Periods): yes

## 2018-01-23 ENCOUNTER — HOSPITAL ENCOUNTER (OUTPATIENT)
Dept: HOSPITAL 62 - RAD | Age: 63
End: 2018-01-23
Attending: INTERNAL MEDICINE
Payer: MEDICAID

## 2018-01-23 DIAGNOSIS — B18.2: Primary | ICD-10-CM

## 2018-01-23 DIAGNOSIS — R94.5: ICD-10-CM

## 2018-01-23 PROCEDURE — 76700 US EXAM ABDOM COMPLETE: CPT

## 2018-01-23 NOTE — RADIOLOGY REPORT (SQ)
EXAM DESCRIPTION:  U/S ABDOMEN COMPLETE W/O DOP



COMPLETED DATE/TIME:  1/23/2018 9:21 am



REASON FOR STUDY:  CHRONIC VIRAL HEPATITIS C B18.2  CHRONIC VIRAL HEPATITIS C R94.5  ABNORMAL RESULTS
 OF LIVER FUNCTION STUDIES



COMPARISON:  None.



TECHNIQUE:  Dynamic and static grayscale images acquired of the abdomen and recorded on PACS. Additio
nal selected color Doppler and spectral images recorded.



LIMITATIONS:  Midline bowel gas



FINDINGS:  PANCREAS: Not well seen

LIVER: Diffuse mild increased echogenicity likely from diffuse hepatocellular disease.  No focal mass
es.  No intrahepatic biliary ductal dilatation

LIVER VASCULATURE: Normal directional flow of the main portal vein and hepatic veins.

GALLBLADDER: No stones. Normal wall thickness. No pericholecystic fluid.

ULTRASOUND-DETECTED FARIAS'S SIGN: Negative.

INTRAHEPATIC DUCTS AND COMMON DUCT: CBD and intrahepatic ducts normal caliber. No filling defects.  D
istal most common duct not well seen due to midline bowel gas

INFERIOR VENA CAVA: Normal flow.

AORTA: Not well seen

RIGHT KIDNEY:  Normal size.   Normal echogenicity.   No solid or suspicious masses.   No hydronephros
is.   No calcifications.

LEFT KIDNEY:  Post left nephrectomy

SPLEEN: Normal size. No solid masses.

PERITONEAL AND PLEURAL SPACES: No ascites or effusions.

OTHER: No other significant finding.



IMPRESSION:  Echogenic liver from diffuse hepatocellular disease.  No discrete masses.

No gallstones.  No biliary ductal dilatation

Post left nephrectomy



TECHNICAL DOCUMENTATION:  JOB ID:  [id number]

 2011 [employer]- All Rights Reserved

## 2018-05-15 ENCOUNTER — HOSPITAL ENCOUNTER (EMERGENCY)
Dept: HOSPITAL 62 - ER | Age: 63
Discharge: HOME | End: 2018-05-15
Payer: MEDICAID

## 2018-05-15 VITALS — SYSTOLIC BLOOD PRESSURE: 129 MMHG | DIASTOLIC BLOOD PRESSURE: 93 MMHG

## 2018-05-15 DIAGNOSIS — M79.672: ICD-10-CM

## 2018-05-15 DIAGNOSIS — F41.9: ICD-10-CM

## 2018-05-15 DIAGNOSIS — F17.200: ICD-10-CM

## 2018-05-15 DIAGNOSIS — F10.10: Primary | ICD-10-CM

## 2018-05-15 PROCEDURE — 99284 EMERGENCY DEPT VISIT MOD MDM: CPT

## 2018-05-15 PROCEDURE — 36415 COLL VENOUS BLD VENIPUNCTURE: CPT

## 2018-05-15 PROCEDURE — 80307 DRUG TEST PRSMV CHEM ANLYZR: CPT

## 2018-05-15 NOTE — ER DOCUMENT REPORT
ED Substance Abuse / Acc. OD





- General


Chief Complaint: ETOH Abuse


Stated Complaint: ANXIETY


Time Seen by Provider: 05/15/18 14:08


Mode of Arrival: Medic


Information source: Patient


Notes: 





Patient is a 63-year-old alcoholic who comes in today via EMS for left foot 

pain.  Patient states that he fell last night, does not remember the injury but 

has left foot pain.  He says "I do not know if I fell or if I kicked something.

"  Patient admits to drinking multiple 40s today.  He apparently told EMS that 

he was anxious but here is just asking for something to eat and stating "I need 

to eat and go home and watch TV."


TRAVEL OUTSIDE OF THE [place] IN LAST 30 DAYS: No





- Related Data


Allergies/Adverse Reactions: 


 





No Known Allergies Allergy (Verified 06/25/13 11:13)


 











Past Medical History





- General


Information source: Patient





- Social History


Smoking Status: Current Every Day Smoker


Chew tobacco use (# tins/day): No


Frequency of alcohol use: Heavy


Drug Abuse: None


Family History: Reviewed & Not Pertinent


Patient has suicidal ideation: No


Patient has homicidal ideation: No





- Past Medical History


Cardiac Medical History: Reports: Hx Hypertension


Neurological Medical History: Reports: Hx Cerebrovascular Accident


Renal/ Medical History: Denies: Hx Peritoneal Dialysis


GI Medical History: Reports: Hx Hepatitis - HEP-C


Musculoskeltal Medical History: Reports Hx Gout


Infectious Medical History: Reports: Hx Hepatitis - HEP-C


Past Surgical History: Reports: Hx Kidney (Renal Surgery) - LEFT NEPHRECTOMY--

DONATED KIDNEY





- Immunizations


Hx Diphtheria, Pertussis, Tetanus Vaccination: No





Review of Systems





- Review of Systems


Constitutional: See HPI


EENT: No symptoms reported


Cardiovascular: No symptoms reported


Respiratory: No symptoms reported


Gastrointestinal: No symptoms reported


Genitourinary: No symptoms reported


Male Genitourinary: No symptoms reported


Musculoskeletal: See HPI


Skin: No symptoms reported


Hematologic/Lymphatic: No symptoms reported


Neurological/Psychological: No symptoms reported





Physical Exam





- Vital signs


Vitals: 


 











Resp Pulse Ox


 


 19   95 


 


 05/15/18 14:19  05/15/18 14:19














- Notes


Notes: 





PHYSICAL EXAMINATION: 


GENERAL: Obviously intoxicated, smells of alcohol, but in no acute distress. 


HEAD: Atraumatic, normocephalic. 


EYES: Pupils equal round and reactive to light, extraocular movements intact, 

sclera anicteric, conjunctiva are normal. 


ENT: ear canals without erythema or foreign body, TMs pearly grey with good 

bony landmarks, nares patent, oropharynx clear without exudates. Moist mucous 

membranes. 


NECK: Normal range of motion, supple without lymphadenopathy 


LUNGS: CTAB and equal. No wheezes rales or rhonchi. 


HEART: Regular rate and rhythm without murmurs


ABDOMEN: Soft, no tenderness. No guarding, no rebound 


BACK: no vertebral tenderness, normal ROM


GI/: no CVA tenderness


EXTREMITIES: Tender to left lateral foot and left second third and fourth 

digits to the foot, otherwise normal range of motion, no pitting edema. No 

cyanosis. 


NEUROLOGICAL: Cranial nerves grossly intact. Normal sensory/motor exams. 


PSYCH: Intoxicated, but understandable and pleasant


SKIN: Warm, Dry, normal turgor, no rashes or lesions noted 

















Course





- Re-evaluation


Re-evalutation: 





05/15/18 18:56


X-ray of the left foot negative for any acute pathology, sister is here to take 

him home and he is stable for discharge with normal vital signs.  We did feed 

him here.  He ate an entire meal without any issues.





- Vital Signs


Vital signs: 


 











Temp Pulse Resp BP Pulse Ox


 


 97.6 F      18   129/93 H  97 


 


 05/15/18 15:25     05/15/18 15:25  05/15/18 15:25  05/15/18 15:25














- Laboratory


Laboratory results interpreted by me: 


 











  05/15/18





  13:40


 


Serum Alcohol  371 H*














Discharge





- Discharge


Clinical Impression: 


 Alcohol abuse, Left foot pain





Condition: Stable


Disposition: HOME, SELF-CARE


Additional Instructions: 


Return immediately for any new or worsening symptoms.





Follow up with primary care provider, call tomorrow to make followup 

appointment.

## 2018-05-15 NOTE — RADIOLOGY REPORT (SQ)
EXAM DESCRIPTION:  FOOT LEFT COMPLETE



COMPLETED DATE/TIME:  5/15/2018 2:58 pm



REASON FOR STUDY:  foot injury, pain



COMPARISON:  5/5/2013 left foot films



NUMBER OF VIEWS:  Three views.



TECHNIQUE:  AP, lateral and oblique  radiographic images acquired of the left foot.



LIMITATIONS:  None.



FINDINGS:  MINERALIZATION: Osteopenic

BONES: No acute fracture or dislocation.  No worrisome bone lesions.

JOINTS: No effusions.

SOFT TISSUES: No soft tissue swelling.  No foreign body.

OTHER: No other significant finding.



IMPRESSION:  No acute fracture



TECHNICAL DOCUMENTATION:  JOB ID:  [id number]

 2011 [employer]- All Rights Reserved



Reading location - IP/workstation name: [email or web address]

## 2018-06-12 ENCOUNTER — HOSPITAL ENCOUNTER (EMERGENCY)
Dept: HOSPITAL 62 - ER | Age: 63
LOS: 1 days | Discharge: HOME | End: 2018-06-13
Payer: MEDICAID

## 2018-06-12 DIAGNOSIS — S09.90XA: Primary | ICD-10-CM

## 2018-06-12 DIAGNOSIS — Z90.5: ICD-10-CM

## 2018-06-12 DIAGNOSIS — F10.920: ICD-10-CM

## 2018-06-12 DIAGNOSIS — Z86.19: ICD-10-CM
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
## 2018-06-12 DIAGNOSIS — I10: ICD-10-CM
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
## 2018-06-12 DIAGNOSIS — N28.9: ICD-10-CM

## 2018-06-12 LAB
%HYPO/RBC NFR BLD AUTO: (no result) %
ABSOLUTE LYMPHOCYTES# (MANUAL): 2.1 10^3/UL (ref 0.5–4.7)
ABSOLUTE MONOCYTES # (MANUAL): 0.5 10^3/UL (ref 0.1–1.4)
ABSOLUTE NEUTROPHILS# (MANUAL): 1.6 10^3/UL (ref 1.7–8.2)
ADD MANUAL DIFF: YES
ALBUMIN SERPL-MCNC: 3.9 G/DL (ref 3.5–5)
ALP SERPL-CCNC: 95 U/L (ref 38–126)
ALT SERPL-CCNC: 71 U/L (ref 21–72)
ANION GAP SERPL CALC-SCNC: 15 MMOL/L (ref 5–19)
APTT BLD: 35 SEC (ref 23.5–35.8)
AST SERPL-CCNC: 158 U/L (ref 17–59)
BASOPHILS NFR BLD MANUAL: 2 % (ref 0–2)
BILIRUB DIRECT SERPL-MCNC: 0.7 MG/DL (ref 0–0.4)
BILIRUB SERPL-MCNC: 1 MG/DL (ref 0.2–1.3)
BUN SERPL-MCNC: 24 MG/DL (ref 7–20)
CALCIUM: 9.2 MG/DL (ref 8.4–10.2)
CHLORIDE SERPL-SCNC: 100 MMOL/L (ref 98–107)
CO2 SERPL-SCNC: 25 MMOL/L (ref 22–30)
EOSINOPHIL NFR BLD MANUAL: 0 % (ref 0–6)
ERYTHROCYTE [DISTWIDTH] IN BLOOD BY AUTOMATED COUNT: 14.6 % (ref 11.5–14)
GLUCOSE SERPL-MCNC: 91 MG/DL (ref 75–110)
HCT VFR BLD CALC: 40.5 % (ref 37.9–51)
HGB BLD-MCNC: 13.7 G/DL (ref 13.5–17)
INR PPP: 1.03
MCH RBC QN AUTO: 33.3 PG (ref 27–33.4)
MCHC RBC AUTO-ENTMCNC: 33.9 G/DL (ref 32–36)
MCV RBC AUTO: 98 FL (ref 80–97)
MONOCYTES % (MANUAL): 12 % (ref 3–13)
PLATELET # BLD: 56 10^3/UL (ref 150–450)
PLATELET COMMENT: (no result)
POLYCHROMASIA BLD QL SMEAR: SLIGHT
POTASSIUM SERPL-SCNC: 3.8 MMOL/L (ref 3.6–5)
PROT SERPL-MCNC: 7.3 G/DL (ref 6.3–8.2)
PROTHROMBIN TIME: 14 SEC (ref 11.4–15.4)
RBC # BLD AUTO: 4.12 10^6/UL (ref 4.35–5.55)
SEGMENTED NEUTROPHILS % (MAN): 37 % (ref 42–78)
SODIUM SERPL-SCNC: 140.1 MMOL/L (ref 137–145)
TOTAL CELLS COUNTED BLD: 100
VARIANT LYMPHS NFR BLD MANUAL: 42 % (ref 13–45)
WBC # BLD AUTO: 4.2 10^3/UL (ref 4–10.5)

## 2018-06-12 PROCEDURE — 72125 CT NECK SPINE W/O DYE: CPT

## 2018-06-12 PROCEDURE — 85730 THROMBOPLASTIN TIME PARTIAL: CPT

## 2018-06-12 PROCEDURE — 99284 EMERGENCY DEPT VISIT MOD MDM: CPT

## 2018-06-12 PROCEDURE — 85610 PROTHROMBIN TIME: CPT

## 2018-06-12 PROCEDURE — 85025 COMPLETE CBC W/AUTO DIFF WBC: CPT

## 2018-06-12 PROCEDURE — 70450 CT HEAD/BRAIN W/O DYE: CPT

## 2018-06-12 PROCEDURE — 80307 DRUG TEST PRSMV CHEM ANLYZR: CPT

## 2018-06-12 PROCEDURE — 36415 COLL VENOUS BLD VENIPUNCTURE: CPT

## 2018-06-12 PROCEDURE — 96360 HYDRATION IV INFUSION INIT: CPT

## 2018-06-12 PROCEDURE — 80053 COMPREHEN METABOLIC PANEL: CPT

## 2018-06-12 NOTE — RADIOLOGY REPORT (SQ)
EXAM DESCRIPTION:  CT CERVICAL SPINE WITHOUT



COMPLETED DATE/TIME:  6/12/2018 9:37 pm



REASON FOR STUDY:  fall, pain



COMPARISON:  None.



TECHNIQUE:  Axial images acquired through the cervical spine without intravenous contrast.  Images re
viewed with lung, soft tissue and bone windows.  Reconstructed coronal and sagittal MPR images review
ed.  Images stored on PACS.

All CT scanners at this facility use dose modulation, iterative reconstruction, and/or weight based d
osing when appropriate to reduce radiation dose to as low as reasonably achievable (ALARA).

[facility]: Dose Right  [facility]: CareDose    [facility]: Dose Right    [facility]: Teradose 4D    [facility]: Smart Technologies



RADIATION DOSE:  CT Rad equipment meets quality standard of care and radiation dose reduction techniq
ues were employed. CTDIvol: 14.8 mGy. DLP: 331 mGy-cm. mGy.



LIMITATIONS:  None.



FINDINGS:  ALIGNMENT: Anatomic.

MINERALIZATION: Normal.

VERTEBRAL BODIES: No fractures or dislocation.

DISCS: Multilevel disc space narrowing with osteophytes.

FACETS, LATERAL MASSES, POSTERIOR ELEMENTS: Facet arthropathy.  No fractures.  No dislocation.  No ac
ruperto findings.

HARDWARE: None in the spine.

VISUALIZED RIBS: No fractures.

LUNG APICES AND SOFT TISSUES: No significant or acute findings.

OTHER: No other significant finding.



IMPRESSION:  CHRONIC DEGENERATIVE CHANGES.  NO ACUTE FINDINGS.



TECHNICAL DOCUMENTATION:  JOB ID:  [id number]

Quality ID # 436: Final reports with documentation of one or more dose reduction techniques (e.g., Au
tomated exposure control, adjustment of the mA and/or kV according to patient size, use of iterative 
reconstruction technique)

 2011 [employer]- All Rights Reserved



Reading location - IP/workstation name: [email or web address]

## 2018-06-12 NOTE — RADIOLOGY REPORT (SQ)
EXAM DESCRIPTION:  CT HEAD WITHOUT



COMPLETED DATE/TIME:  6/12/2018 9:37 pm



REASON FOR STUDY:  fall, hematoma



COMPARISON:  6/25/2013



TECHNIQUE:  Axial images acquired through the brain without intravenous contrast.  Images reviewed wi
th bone, brain and subdural windows.  Additional sagittal and coronal reconstructions were generated.
 Images stored on PACS.

All CT scanners at this facility use dose modulation, iterative reconstruction, and/or weight based d
osing when appropriate to reduce radiation dose to as low as reasonably achievable (ALARA).

[facility]: Dose Right  [facility]: CareDose    [facility]: Dose Right    [facility]: Teradose 4D    [facility]: Smart [employer]



RADIATION DOSE:  CT Rad equipment meets quality standard of care and radiation dose reduction techniq
ues were employed. CTDIvol: 53.2 mGy. DLP: 964 mGy-cm. mGy.



LIMITATIONS:  None.



FINDINGS:  VENTRICLES: Normal size and contour.

CEREBRUM: No masses.  No hemorrhage.  No midline shift.  There is an area of encephalomalacia in the 
occipital lobe on the right. Small areas of low density in the white matter most likely chronic small
 vessel ischemic changes.

CEREBELLUM: No masses.  No hemorrhage.  No alteration of density.  No evidence for acute infarction.

EXTRAAXIAL SPACES: No fluid collections.  No masses.

ORBITS AND GLOBE: No intra- or extraconal masses.  Normal contour of globe without masses.

CALVARIUM: No fracture.

PARANASAL SINUSES: No fluid or mucosal thickening.

SOFT TISSUES: Left posterior parietal scalp hematoma.

OTHER: No other significant finding.



IMPRESSION:  Scalp hematoma.  Old right occipital infarct.  Mild chronic microvascular ischemia with 
no acute imaging findings in the brain.

EVIDENCE OF ACUTE STROKE: NO.



COMMENT:  Quality ID # 436: Final reports with documentation of one or more dose reduction techniques
 (e.g., Automated exposure control, adjustment of the mA and/or kV according to patient size, use of 
iterative reconstruction technique)



TECHNICAL DOCUMENTATION:  JOB ID:  [id number]

 2011 [employer]- All Rights Reserved



Reading location - IP/workstation name: [email or web address]

## 2018-06-12 NOTE — ER DOCUMENT REPORT
ED Fall





- General


Mode of Arrival: Wheelchair


Information source: Patient


TRAVEL OUTSIDE OF THE [place] IN LAST 30 DAYS: No





<VERONIKA VALENCIA - Last Filed: 06/12/18 21:30>





<CATHERINE LANDON - Last Filed: 06/13/18 01:52>





- General


Stated Complaint: FALL


Notes: 





63 y.o. male with HTN and Hepatitis C presents to the ED s/p fall with a hit to 

the LT posterior head. Pt reports that he fell in his house at the front door. 

He denies being on blood thinners. Reports taking medications for HTN. Pt 

denies any injury or pain to his extremities, chest or back.  (VERONIKA VALENCIA)





- Related data


Allergies/Adverse Reactions: 


 





No Known Allergies Allergy (Verified 06/25/13 11:13)


 











Past Medical History





- General


Information source: Patient





- Social History


Family History: Reviewed & Not Pertinent





- Past Medical History


Cardiac Medical History: Reports: Hx Hypertension


Neurological Medical History: Reports: Hx Cerebrovascular Accident


Renal/ Medical History: Denies: Hx Peritoneal Dialysis


GI Medical History: Reports: Hx Hepatitis - HEP-C


Musculoskeltal Medical History: Reports Hx Gout


Infectious Medical History: Reports: Hx Hepatitis - HEP-C


Past Surgical History: Reports: Hx Kidney (Renal Surgery) - LEFT NEPHRECTOMY--

DONATED KIDNEY





- Immunizations


Hx Diphtheria, Pertussis, Tetanus Vaccination: No





<VERONIKA VALENCIA - Last Filed: 06/12/18 21:30>





- Social History


Smoking Status: Unknown if Ever Smoked


Frequency of alcohol use: Heavy





<CATHERINE LANDON - Last Filed: 06/13/18 01:52>





Review of Systems





- Review of Systems


Constitutional: No symptoms reported


EENT: No symptoms reported


Cardiovascular: See HPI.  denies: Chest pain


Respiratory: No symptoms reported


Gastrointestinal: No symptoms reported


Genitourinary: No symptoms reported


Male Genitourinary: No symptoms reported


Musculoskeletal: See HPI.  denies: Back pain


Skin: No symptoms reported


Hematologic/Lymphatic: No symptoms reported


Neurological/Psychological: See HPI, Other - injury to head


-: Yes All other systems reviewed and negative





<VERONIKA VALENCIA - Last Filed: 06/12/18 21:30>





Physical Exam





<VERONIKA VALENCIA - Last Filed: 06/12/18 21:30>





<CATHERINE LANDON - Last Filed: 06/13/18 01:52>





- Vital signs


Vitals: 


 











Temp Pulse Resp BP Pulse Ox


 


 97.4 F   79   18   117/83   96 


 


 06/12/18 21:22  06/12/18 21:22  06/12/18 21:22  06/12/18 21:22  06/12/18 21:22














- Notes


Notes: 





Physical Exam:


 





General: Alert. Intoxicated.


 


HEENT: Large hematoma to posterior head. PERRL. Extraocular movements intact. 

injected conjunctiva bilaterally. 


 


Neck: Supple. Non-tender.


 


Respiratory: No respiratory distress. Clear and equal breath sounds bilaterally.


 


Cardiovascular: Regular rate and rhythm. 


 


Abdominal: Normal Inspection. Non-tender. No distension. Normal Bowel Sounds. 


 


Back: Non-tender. No deformity or step off.


 


Extremities: Moves all four extremities.


Upper extremities: Normal inspection. Normal ROM.  


Lower extremities: Normal inspection. No edema. Normal ROM.


 


Neurological: Intoxicated. AAOx3. Normal speech.


 


Psychological: Normal affect. Normal Mood. 


 


Skin: Warm. Dry. Normal color.


 (VERONIKA VALENCIA)





Course





<VERONIKA VALENCIA - Last Filed: 06/12/18 21:30>





- Laboratory


Result Diagrams: 


 06/12/18 22:07





 06/12/18 22:07





- Diagnostic Test


Radiology reviewed: Reports reviewed





<CATHERINE LANDON - Last Filed: 06/13/18 01:52>





- Re-evaluation


Re-evalutation: 





06/13/18 01:50


Patient is a 63-year-old male with a history of alcohol abuse who comes in 

after a fall at home where he tripped inside his door and hit the back of his 

head.  No acute findings on head CT or cervical spine CT.  Patient does have 

some renal insufficiency he informs me that he has had this before and he 

already knows about it.  Patient keeps requesting to go home.  States that his 

sister will pick him up.  Patient has been given fluids, folic acid, and 

thiamine.  He is able to ambulate without difficulty in the department.  Sister 

is indeed here to get him.  He is to follow-up with his doctor this week to 

discuss his kidney function and hematoma.  Recommend that he reduces his 

alcohol intake.  Stable for discharge.  Return if any further concerns. (

CATHERINE LANDON)





- Vital Signs


Vital signs: 


 











Temp Pulse Resp BP Pulse Ox


 


 98.0 F   72   16   136/80 H  100 


 


 06/13/18 00:56  06/13/18 00:56  06/13/18 00:56  06/13/18 00:56  06/13/18 00:56














- Laboratory


Laboratory results interpreted by me: 


 











  06/12/18 06/12/18





  22:07 22:07


 


RBC  4.12 L 


 


MCV  98 H 


 


RDW  14.6 H 


 


Plt Count  56 L 


 


Seg Neuts % (Manual)  37 L 


 


Abs Neuts (Manual)  1.6 L 


 


BUN   24 H


 


Creatinine   2.32 H


 


Est GFR ( Amer)   35 L


 


Est GFR (Non-Af Amer)   29 L


 


Direct Bilirubin   0.7 H


 


AST   158 H














Discharge





<VERONIKA VALENCIA - Last Filed: 06/12/18 21:30>





<CATHERINE LANDON - Last Filed: 06/13/18 01:52>





- Discharge


Clinical Impression: 


 Renal insufficiency





Closed head injury


Qualifiers:


 Encounter type: initial encounter Qualified Code(s): S09.90XA - Unspecified 

injury of head, initial encounter





Alcohol intoxication


Qualifiers:


 Complication of substance-induced condition: uncomplicated Qualified Code(s): 

F10.920 - Alcohol use, unspecified with intoxication, uncomplicated





Condition: Stable


Disposition: HOME, SELF-CARE


Instructions:  Acute Alcohol Intoxication ([facility]), Head Injury Precautions ([facility]), 

Scalp Hematoma ([facility]), Kidney Failure ([facility])


Additional Instructions: 


Please follow-up with your doctor this week.


Ferozibe Attestation: 





06/13/18 01:52


I personally performed the services described in the documentation, reviewed 

and edited the documentation which was dictated to the scribe in my presence, 

and it accurately records my words and actions. (CATHERINE LANDON)





Scribe Documentation





- Scribe


Written by Savanah:: Savanah Daniels 6/12/18 2129


acting as scribe for :: Julian





<VERONIKA VALENCIA - Last Filed: 06/12/18 21:30>

## 2018-06-13 VITALS — DIASTOLIC BLOOD PRESSURE: 80 MMHG | SYSTOLIC BLOOD PRESSURE: 136 MMHG

## 2018-08-25 ENCOUNTER — HOSPITAL ENCOUNTER (EMERGENCY)
Dept: HOSPITAL 62 - ER | Age: 63
Discharge: LEFT BEFORE DISCHARGE | End: 2018-08-25
Payer: MEDICAID

## 2018-08-25 VITALS — SYSTOLIC BLOOD PRESSURE: 156 MMHG | DIASTOLIC BLOOD PRESSURE: 102 MMHG

## 2018-08-25 DIAGNOSIS — R51: Primary | ICD-10-CM

## 2018-08-25 DIAGNOSIS — Z86.19: ICD-10-CM

## 2018-08-25 DIAGNOSIS — I10: ICD-10-CM

## 2018-08-25 DIAGNOSIS — F17.200: ICD-10-CM

## 2018-08-25 LAB
ADD MANUAL DIFF: NO
ALBUMIN SERPL-MCNC: 3.9 G/DL (ref 3.5–5)
ALP SERPL-CCNC: 145 U/L (ref 38–126)
ALT SERPL-CCNC: 69 U/L (ref 21–72)
ANION GAP SERPL CALC-SCNC: 14 MMOL/L (ref 5–19)
AST SERPL-CCNC: 178 U/L (ref 17–59)
BASOPHILS # BLD AUTO: 0.1 10^3/UL (ref 0–0.2)
BASOPHILS NFR BLD AUTO: 2.5 % (ref 0–2)
BILIRUB DIRECT SERPL-MCNC: 0.8 MG/DL (ref 0–0.4)
BILIRUB SERPL-MCNC: 1.1 MG/DL (ref 0.2–1.3)
BUN SERPL-MCNC: 5 MG/DL (ref 7–20)
CALCIUM: 8.6 MG/DL (ref 8.4–10.2)
CHLORIDE SERPL-SCNC: 106 MMOL/L (ref 98–107)
CO2 SERPL-SCNC: 30 MMOL/L (ref 22–30)
EOSINOPHIL # BLD AUTO: 0.2 10^3/UL (ref 0–0.6)
EOSINOPHIL NFR BLD AUTO: 4.3 % (ref 0–6)
ERYTHROCYTE [DISTWIDTH] IN BLOOD BY AUTOMATED COUNT: 14.3 % (ref 11.5–14)
ETHANOL SERPL-MCNC: 340 MG/DL
GLUCOSE SERPL-MCNC: 85 MG/DL (ref 75–110)
HCT VFR BLD CALC: 42.7 % (ref 37.9–51)
HGB BLD-MCNC: 14.8 G/DL (ref 13.5–17)
LYMPHOCYTES # BLD AUTO: 1.8 10^3/UL (ref 0.5–4.7)
LYMPHOCYTES NFR BLD AUTO: 47.4 % (ref 13–45)
MCH RBC QN AUTO: 33.7 PG (ref 27–33.4)
MCHC RBC AUTO-ENTMCNC: 34.6 G/DL (ref 32–36)
MCV RBC AUTO: 97 FL (ref 80–97)
MONOCYTES # BLD AUTO: 0.6 10^3/UL (ref 0.1–1.4)
MONOCYTES NFR BLD AUTO: 15.6 % (ref 3–13)
NEUTROPHILS # BLD AUTO: 1.1 10^3/UL (ref 1.7–8.2)
NEUTS SEG NFR BLD AUTO: 30.2 % (ref 42–78)
PLATELET # BLD: 97 10^3/UL (ref 150–450)
POTASSIUM SERPL-SCNC: 4 MMOL/L (ref 3.6–5)
PROT SERPL-MCNC: 7.9 G/DL (ref 6.3–8.2)
RBC # BLD AUTO: 4.39 10^6/UL (ref 4.35–5.55)
SODIUM SERPL-SCNC: 149.8 MMOL/L (ref 137–145)
TOTAL CELLS COUNTED % (AUTO): 100 %
WBC # BLD AUTO: 3.7 10^3/UL (ref 4–10.5)

## 2018-08-25 PROCEDURE — 86140 C-REACTIVE PROTEIN: CPT

## 2018-08-25 PROCEDURE — 70450 CT HEAD/BRAIN W/O DYE: CPT

## 2018-08-25 PROCEDURE — 99281 EMR DPT VST MAYX REQ PHY/QHP: CPT

## 2018-08-25 PROCEDURE — 80053 COMPREHEN METABOLIC PANEL: CPT

## 2018-08-25 PROCEDURE — 80307 DRUG TEST PRSMV CHEM ANLYZR: CPT

## 2018-08-25 PROCEDURE — 82140 ASSAY OF AMMONIA: CPT

## 2018-08-25 PROCEDURE — 85025 COMPLETE CBC W/AUTO DIFF WBC: CPT

## 2018-08-25 PROCEDURE — 85652 RBC SED RATE AUTOMATED: CPT

## 2018-08-25 PROCEDURE — 36415 COLL VENOUS BLD VENIPUNCTURE: CPT

## 2018-08-25 NOTE — ER DOCUMENT REPORT
ED Medical Screen (RME)





- General


Chief Complaint: Headache


Stated Complaint: HEADACHE


Time Seen by Provider: 08/25/18 18:53


Mode of Arrival: Ambulatory


TRAVEL OUTSIDE OF THE [place] IN LAST 30 DAYS: No





- HPI


Patient complains to provider of: headache


Onset: Other - This 63-year-old man with a history of alcoholism tobacco abuse 

and occasional marijuana use presents for evaluation of a headache which he has 

had over the last 2 days.  He complains that the headache is throbbing in 

quality and he otherwise feels just sick. Denies any shortness of breath 

palpitations chest pain abdominal pain diarrhea constipation dysuria focal 

numbness or weakness he has never had anything like this in the past has not 

taken anything to try and help with that he daily drinks a large volume of 

alcohol.





- Related Data


Allergies/Adverse Reactions: 


 





No Known Allergies Allergy (Verified 08/25/18 17:42)


 











Past Medical History





- Social History


Frequency of alcohol use: Heavy


Drug Abuse: Marijuana





- Past Medical History


Cardiac Medical History: Reports: Hx Hypertension


Neurological Medical History: Reports: Hx Cerebrovascular Accident


Renal/ Medical History: Denies: Hx Peritoneal Dialysis


GI Medical History: Reports: Hx Hepatitis - HEP-C


Musculoskeltal Medical History: Reports Hx Gout


Infectious Medical History: Reports: Hx Hepatitis - HEP-C


Past Surgical History: Reports: Hx Kidney (Renal Surgery) - LEFT NEPHRECTOMY--

DONATED KIDNEY





- Immunizations


Hx Diphtheria, Pertussis, Tetanus Vaccination: No





Physical Exam





- Vital signs


Vitals: 


 











Temp Pulse Resp BP Pulse Ox


 


 98.7 F   85   14   156/102 H  98 


 


 08/25/18 17:49  08/25/18 17:49  08/25/18 17:49  08/25/18 17:49  08/25/18 17:49














Course





- Re-evaluation


Re-evalutation: 





08/25/18 20:46


This 63-year-old male with a history of chronic alcoholism who presents for 

evaluation of a headache behind the eyes, he states that he might of been hit 

in the head at some point.


Nothing is seen to make it any better nothing seems to make it worse.


Because of his multiple medical comorbidities will plan for this patient 

undergo CT of the head for possible subdural hematoma as well as chemistry and 

count.








- Vital Signs


Vital signs: 


 











Temp Pulse Resp BP Pulse Ox


 


 98.7 F   85   14   156/102 H  98 


 


 08/25/18 17:49  08/25/18 17:49  08/25/18 17:49  08/25/18 17:49  08/25/18 17:49














- Laboratory


Result Diagrams: 


 08/25/18 19:01





 08/25/18 19:01


Laboratory results interpreted by me: 


 











  08/25/18 08/25/18 08/25/18





  19:01 19:01 19:01


 


WBC  3.7 L  


 


MCH  33.7 H  


 


RDW  14.3 H  


 


Plt Count  97 L  


 


Seg Neutrophils %  30.2 L  


 


Lymphocytes %  47.4 H  


 


Monocytes %  15.6 H  


 


Basophils %  2.5 H  


 


Absolute Neutrophils  1.1 L  


 


Sodium   149.8 H 


 


BUN   5 L 


 


Direct Bilirubin   0.8 H 


 


AST   178 H 


 


Alkaline Phosphatase   145 H 


 


Ammonia    < 8.7 L


 


Serum Alcohol   340 H* 














Doctor's Discharge





- Discharge


Disposition: ELOPED

## 2018-08-25 NOTE — ER DOCUMENT REPORT
ED Headache





- General


Chief Complaint: Headache


Stated Complaint: HEADACHE


Time Seen by Provider: 08/25/18 18:53


Mode of Arrival: Ambulatory


Information source: Patient


TRAVEL OUTSIDE OF THE [place] IN LAST 30 DAYS: No





- HPI


Patient complains to provider of: Headache


Onset: Yesterday


Onset was: Gradual


Timing: Still present


Quality of pain: Achy, Fullness


Severity: Mild


Associated symptoms: None


Notes: 





Patient is a 63-year-old male presenting to the emergency room today 

complaining of frontal headache 2 days duration, he does report a fall 

recently and has a history of alcohol abuse, he denies any blurred vision, no 

nausea or vomiting, no chest pain or shortness of breath, denies any neck pain 

as well, no numbness or tingling in his extremities





- Related Data


Allergies/Adverse Reactions: 


 





No Known Allergies Allergy (Verified 08/25/18 17:42)


 











Past Medical History





- General


Information source: Patient





- Social History


Smoking Status: Current Every Day Smoker


Frequency of alcohol use: Heavy


Drug Abuse: Marijuana


Family History: Reviewed & Not Pertinent


Patient has suicidal ideation: No


Patient has homicidal ideation: No





- Past Medical History


Cardiac Medical History: Reports: Hx Hypertension


Neurological Medical History: Reports: Hx Cerebrovascular Accident


Renal/ Medical History: Denies: Hx Peritoneal Dialysis


GI Medical History: Reports: Hx Hepatitis - HEP-C


Musculoskeletal Medical History: Reports Hx Gout


Infectious Medical History: Reports: Hx Hepatitis - HEP-C


Past Surgical History: Reports: Hx Kidney (Renal Surgery) - LEFT NEPHRECTOMY--

DONATED KIDNEY





- Immunizations


Hx Diphtheria, Pertussis, Tetanus Vaccination: No





Review of Systems





- Review of Systems


Constitutional: No symptoms reported


EENT: No symptoms reported


Cardiovascular: No symptoms reported


Respiratory: No symptoms reported


Gastrointestinal: No symptoms reported


Genitourinary: No symptoms reported


Male Genitourinary: No symptoms reported


Musculoskeletal: No symptoms reported


Skin: No symptoms reported


Hematologic/Lymphatic: No symptoms reported


Neurological/Psychological: Headaches


-: Yes All other systems reviewed and negative





Physical Exam





- Vital signs


Vitals: 


 











Temp Pulse Resp BP Pulse Ox


 


 98.7 F   85   14   156/102 H  98 


 


 08/25/18 17:49  08/25/18 17:49  08/25/18 17:49  08/25/18 17:49  08/25/18 17:49











Interpretation: Normal





- General


General appearance: Alert


In distress: None


Notes: 





EtOH on breath





- HEENT


Head: Normocephalic, Atraumatic


Eyes: Normal


Pupils: PERRL





- Respiratory


Respiratory status: No respiratory distress


Chest status: Nontender


Breath sounds: Normal


Chest palpation: Normal





- Cardiovascular


Rhythm: Regular


Heart sounds: Normal auscultation


Murmur: No





- Abdominal


Inspection: Normal


Distension: No distension


Bowel sounds: Normal


Tenderness: Nontender


Organomegaly: No organomegaly





- Back


Back: Normal, Nontender





- Extremities


General upper extremity: Normal inspection, Nontender, Normal color, Normal ROM

, Normal temperature


General lower extremity: Normal inspection, Nontender, Normal color, Normal ROM

, Normal temperature, Normal weight bearing.  No: Mickey's sign





- Neurological


Neuro grossly intact: Yes


Cognition: Normal


Orientation: AAOx4


Gato Coma Scale Eye Opening: Spontaneous


Berwick Coma Scale Verbal: Oriented


Gato Coma Scale Motor: Obeys Commands


Berwick Coma Scale Total: 15


Speech: Normal


Motor strength normal: LUE, RUE, LLE, RLE


Sensory: Normal





- Psychological


Associated symptoms: Normal affect, Normal mood





- Skin


Skin Temperature: Warm


Skin Moisture: Dry


Skin Color: Normal





Course





- Re-evaluation


Re-evalutation: 





08/25/18 20:05


Notified by nursing staff that patient eloped from the department prior to 

completion of treatment





- Vital Signs


Vital signs: 


 











Temp Pulse Resp BP Pulse Ox


 


 98.7 F   85   14   156/102 H  98 


 


 08/25/18 17:49  08/25/18 17:49  08/25/18 17:49  08/25/18 17:49  08/25/18 17:49














- Laboratory


Result Diagrams: 


 08/25/18 19:01





 08/25/18 19:01


Laboratory results interpreted by me: 


 











  08/25/18 08/25/18 08/25/18





  19:01 19:01 19:01


 


WBC  3.7 L  


 


MCH  33.7 H  


 


RDW  14.3 H  


 


Plt Count  97 L  


 


Seg Neutrophils %  30.2 L  


 


Lymphocytes %  47.4 H  


 


Monocytes %  15.6 H  


 


Basophils %  2.5 H  


 


Absolute Neutrophils  1.1 L  


 


Sodium   149.8 H 


 


BUN   5 L 


 


Direct Bilirubin   0.8 H 


 


AST   178 H 


 


Alkaline Phosphatase   145 H 


 


Ammonia    < 8.7 L


 


Serum Alcohol   340 H* 














- Diagnostic Test


Radiology reviewed: Image reviewed, Reports reviewed





Discharge





- Discharge


Disposition: ELOPED

## 2018-08-25 NOTE — RADIOLOGY REPORT (SQ)
EXAM DESCRIPTION:  CT HEAD WITHOUT



COMPLETED DATE/TIME:  8/25/2018 7:13 pm



REASON FOR STUDY:  concern for subdural



COMPARISON:  6/12/2018



TECHNIQUE:  Axial images acquired through the brain without intravenous contrast.  Images reviewed wi
th bone, brain and subdural windows.  Additional sagittal and coronal reconstructions were generated.
 Images stored on PACS.

All CT scanners at this facility use dose modulation, iterative reconstruction, and/or weight based d
osing when appropriate to reduce radiation dose to as low as reasonably achievable (ALARA).

[facility]: Dose Right  [facility]: CareDose    [facility]: Dose Right    [facility]: Teradose 4D    [facility]: Smart Technologies



RADIATION DOSE:  CT Rad equipment meets quality standard of care and radiation dose reduction techniq
ues were employed. CTDIvol: 53.2 mGy. DLP: 1017 mGy-cm. mGy.



LIMITATIONS:  None.



FINDINGS:  VENTRICLES: Mild ex vacuo dilatation.

CEREBRUM: No masses.  No hemorrhage.  No midline shift.  Right occipital encephalomalacia, stable.  A
reas of low density in the white matter most likely due to chronic micro-vascular ischemic change.  N
o evidence for acute infarction.

CEREBELLUM: No masses.  No hemorrhage.  No alteration of density.  No evidence for acute infarction.

EXTRAAXIAL SPACES: Mild age-related involutional change.  No fluid collections.  No masses.

ORBITS AND GLOBE: No intra- or extraconal masses.  Normal contour of globe without masses.

CALVARIUM: No fracture.

PARANASAL SINUSES: No fluid or mucosal thickening.

SOFT TISSUES: No mass or hematoma.

OTHER: No other significant finding.



IMPRESSION:  No evidence of calvarial injury or intracranial hemorrhage.  Table background of right o
ccipital encephalomalacia on a background of mild microvascular and involutional changes.

EVIDENCE OF ACUTE STROKE: NO.



TECHNICAL DOCUMENTATION:  JOB ID:  [id number]

Quality ID # [id number]: Final reports with documentation of one or more dose reduction techniques (e.g., Au
tomated exposure control, adjustment of the mA and/or kV according to patient size, use of iterative 
reconstruction technique)

 2011 [employer]- All Rights Reserved



Reading location - IP/workstation name: [email or web address]

## 2018-12-24 ENCOUNTER — HOSPITAL ENCOUNTER (EMERGENCY)
Dept: HOSPITAL 62 - ER | Age: 63
Discharge: HOME | End: 2018-12-24
Payer: MEDICAID

## 2018-12-24 VITALS — SYSTOLIC BLOOD PRESSURE: 152 MMHG | DIASTOLIC BLOOD PRESSURE: 104 MMHG

## 2018-12-24 DIAGNOSIS — M25.572: ICD-10-CM

## 2018-12-24 DIAGNOSIS — Z86.19: ICD-10-CM

## 2018-12-24 DIAGNOSIS — I10: ICD-10-CM

## 2018-12-24 DIAGNOSIS — Z86.73: ICD-10-CM

## 2018-12-24 DIAGNOSIS — M10.9: Primary | ICD-10-CM

## 2018-12-24 DIAGNOSIS — F17.200: ICD-10-CM

## 2018-12-24 PROCEDURE — 99283 EMERGENCY DEPT VISIT LOW MDM: CPT

## 2019-04-01 ENCOUNTER — APPOINTMENT (RX ONLY)
Dept: URBAN - METROPOLITAN AREA CLINIC 45 | Facility: CLINIC | Age: 64
Setting detail: DERMATOLOGY
End: 2019-04-01

## 2019-04-01 DIAGNOSIS — L57.0 ACTINIC KERATOSIS: ICD-10-CM

## 2019-04-01 DIAGNOSIS — L91.8 OTHER HYPERTROPHIC DISORDERS OF THE SKIN: ICD-10-CM

## 2019-04-01 DIAGNOSIS — Z85.828 PERSONAL HISTORY OF OTHER MALIGNANT NEOPLASM OF SKIN: ICD-10-CM

## 2019-04-01 DIAGNOSIS — L82.1 OTHER SEBORRHEIC KERATOSIS: ICD-10-CM

## 2019-04-01 PROCEDURE — 17003 DESTRUCT PREMALG LES 2-14: CPT

## 2019-04-01 PROCEDURE — 99213 OFFICE O/P EST LOW 20 MIN: CPT | Mod: 25

## 2019-04-01 PROCEDURE — ? COUNSELING

## 2019-04-01 PROCEDURE — ? LIQUID NITROGEN

## 2019-04-01 PROCEDURE — 17000 DESTRUCT PREMALG LESION: CPT

## 2019-04-01 ASSESSMENT — LOCATION ZONE DERM
LOCATION ZONE: SCALP
LOCATION ZONE: AXILLAE
LOCATION ZONE: TRUNK
LOCATION ZONE: FACE

## 2019-04-01 ASSESSMENT — LOCATION SIMPLE DESCRIPTION DERM
LOCATION SIMPLE: ANTERIOR SCALP
LOCATION SIMPLE: LEFT FOREHEAD
LOCATION SIMPLE: POSTERIOR SCALP
LOCATION SIMPLE: RIGHT AXILLARY VAULT
LOCATION SIMPLE: LEFT AXILLARY VAULT
LOCATION SIMPLE: SCALP
LOCATION SIMPLE: LEFT UPPER BACK
LOCATION SIMPLE: LEFT CHEST

## 2019-04-01 ASSESSMENT — LOCATION DETAILED DESCRIPTION DERM
LOCATION DETAILED: RIGHT AXILLARY VAULT
LOCATION DETAILED: LEFT AREOLA
LOCATION DETAILED: RIGHT SUPERIOR PARIETAL SCALP
LOCATION DETAILED: LEFT FOREHEAD
LOCATION DETAILED: LEFT SUPERIOR UPPER BACK
LOCATION DETAILED: LEFT AXILLARY VAULT
LOCATION DETAILED: MID-OCCIPITAL SCALP
LOCATION DETAILED: MID-FRONTAL SCALP

## 2019-04-11 ENCOUNTER — HOSPITAL ENCOUNTER (EMERGENCY)
Dept: HOSPITAL 62 - ER | Age: 64
Discharge: HOME | End: 2019-04-11
Payer: MEDICAID

## 2019-04-11 VITALS — DIASTOLIC BLOOD PRESSURE: 65 MMHG | SYSTOLIC BLOOD PRESSURE: 133 MMHG

## 2019-04-11 DIAGNOSIS — D69.6: ICD-10-CM

## 2019-04-11 DIAGNOSIS — R26.2: ICD-10-CM

## 2019-04-11 DIAGNOSIS — F10.288: ICD-10-CM

## 2019-04-11 DIAGNOSIS — T73.0XXA: Primary | ICD-10-CM

## 2019-04-11 DIAGNOSIS — I10: ICD-10-CM

## 2019-04-11 DIAGNOSIS — Z86.19: ICD-10-CM

## 2019-04-11 DIAGNOSIS — Z91.81: ICD-10-CM

## 2019-04-11 DIAGNOSIS — K70.9: ICD-10-CM

## 2019-04-11 DIAGNOSIS — R53.1: ICD-10-CM

## 2019-04-11 DIAGNOSIS — F17.210: ICD-10-CM

## 2019-04-11 LAB
ADD MANUAL DIFF: NO
ALBUMIN SERPL-MCNC: 3.4 G/DL (ref 3.5–5)
ALP SERPL-CCNC: 209 U/L (ref 38–126)
ALT SERPL-CCNC: 60 U/L (ref 21–72)
ANION GAP SERPL CALC-SCNC: 12 MMOL/L (ref 5–19)
AST SERPL-CCNC: 227 U/L (ref 17–59)
BASOPHILS # BLD AUTO: 0 10^3/UL (ref 0–0.2)
BASOPHILS NFR BLD AUTO: 1.2 % (ref 0–2)
BILIRUB DIRECT SERPL-MCNC: 1.5 MG/DL (ref 0–0.4)
BILIRUB SERPL-MCNC: 2.2 MG/DL (ref 0.2–1.3)
BUN SERPL-MCNC: 10 MG/DL (ref 7–20)
CALCIUM: 8.7 MG/DL (ref 8.4–10.2)
CHLORIDE SERPL-SCNC: 102 MMOL/L (ref 98–107)
CO2 SERPL-SCNC: 26 MMOL/L (ref 22–30)
EOSINOPHIL # BLD AUTO: 0 10^3/UL (ref 0–0.6)
EOSINOPHIL NFR BLD AUTO: 1.2 % (ref 0–6)
ERYTHROCYTE [DISTWIDTH] IN BLOOD BY AUTOMATED COUNT: 13.6 % (ref 11.5–14)
ETHANOL SERPL-MCNC: 281 MG/DL
GLUCOSE SERPL-MCNC: 133 MG/DL (ref 75–110)
HCT VFR BLD CALC: 38.8 % (ref 37.9–51)
HGB BLD-MCNC: 13.3 G/DL (ref 13.5–17)
LYMPHOCYTES # BLD AUTO: 1 10^3/UL (ref 0.5–4.7)
LYMPHOCYTES NFR BLD AUTO: 28.5 % (ref 13–45)
MCH RBC QN AUTO: 33.6 PG (ref 27–33.4)
MCHC RBC AUTO-ENTMCNC: 34.3 G/DL (ref 32–36)
MCV RBC AUTO: 98 FL (ref 80–97)
MONOCYTES # BLD AUTO: 0.4 10^3/UL (ref 0.1–1.4)
MONOCYTES NFR BLD AUTO: 11.1 % (ref 3–13)
NEUTROPHILS # BLD AUTO: 2 10^3/UL (ref 1.7–8.2)
NEUTS SEG NFR BLD AUTO: 58 % (ref 42–78)
PLATELET # BLD: 38 10^3/UL (ref 150–450)
POTASSIUM SERPL-SCNC: 3.8 MMOL/L (ref 3.6–5)
PROT SERPL-MCNC: 7.7 G/DL (ref 6.3–8.2)
RBC # BLD AUTO: 3.96 10^6/UL (ref 4.35–5.55)
SODIUM SERPL-SCNC: 139.9 MMOL/L (ref 137–145)
TOTAL CELLS COUNTED % (AUTO): 100 %
WBC # BLD AUTO: 3.4 10^3/UL (ref 4–10.5)

## 2019-04-11 PROCEDURE — 36415 COLL VENOUS BLD VENIPUNCTURE: CPT

## 2019-04-11 PROCEDURE — 85025 COMPLETE CBC W/AUTO DIFF WBC: CPT

## 2019-04-11 PROCEDURE — 80307 DRUG TEST PRSMV CHEM ANLYZR: CPT

## 2019-04-11 PROCEDURE — 82140 ASSAY OF AMMONIA: CPT

## 2019-04-11 PROCEDURE — 99284 EMERGENCY DEPT VISIT MOD MDM: CPT

## 2019-04-11 PROCEDURE — 80053 COMPREHEN METABOLIC PANEL: CPT

## 2019-04-11 NOTE — ER DOCUMENT REPORT
Entered by ROBBIE HAMPTON SCRIBE  04/11/19 1448 





Acting as scribe for:EDDY BONDS DO





ED General





- General


Chief Complaint: ETOH Abuse


Stated Complaint: WEAKNESS


Time Seen by Provider: 04/11/19 14:37


Mode of Arrival: Ambulatory


Information source: Patient


Notes: 





63-year-old male who presents to the emergency department today with complaints 

of bilateral leg weakness.  The patient's only complaint is that for the last 

few days, when he is walking, sometimes his "legs give out".  Patient states he 

has not hit his head during these falls. EMS stated that he was able to ambulate

to the squad without difficulty. EMS also reported that they believe if they 

would have had food to give the patient, he likely would not have wanted to come

to the emergency department as it appeared his main concern for them was his 

hunger.


TRAVEL OUTSIDE OF THE [place] IN LAST 30 DAYS: No





- Related Data


Allergies/Adverse Reactions: 


                                        





No Known Allergies Allergy (Verified 12/24/18 15:58)


   











Past Medical History





- General


Information source: Patient





- Social History


Smoking Status: Current Every Day Smoker


Cigarette use (# per day): Yes


Frequency of alcohol use: Heavy


Drug Abuse: None


Lives with: Family


Family History: Reviewed & Not Pertinent


Patient has suicidal ideation: No


Patient has homicidal ideation: No





- Past Medical History


Cardiac Medical History: Reports: Hx Hypertension


Neurological Medical History: Reports: Hx Cerebrovascular Accident


Renal/ Medical History: Denies: Hx Peritoneal Dialysis


GI Medical History: Reports: Hx Hepatitis - HEP-C


Musculoskeletal Medical History: Reports Hx Gout


Infectious Medical History: Reports: Hx Hepatitis - HEP-C


Past Surgical History: Reports: Hx Kidney (Renal Surgery) - LEFT NEPHRECTOMY--

DONATED KIDNEY





- Immunizations


Hx Diphtheria, Pertussis, Tetanus Vaccination: No





Review of Systems





- Review of Systems


Constitutional: No symptoms reported


EENT: No symptoms reported


Cardiovascular: No symptoms reported


Respiratory: No symptoms reported


Gastrointestinal: No symptoms reported


Genitourinary: No symptoms reported


Male Genitourinary: No symptoms reported


Musculoskeletal: See HPI, Other - bilateral leg weakness


Skin: No symptoms reported


Hematologic/Lymphatic: No symptoms reported


Neurological/Psychological: No symptoms reported


-: Yes All other systems reviewed and negative





Physical Exam





- Vital signs


Vitals: 


                                        











Temp Pulse Resp BP Pulse Ox


 


 98.0 F   85   16   133/65 H  98 


 


 04/11/19 14:39  04/11/19 14:39  04/11/19 14:39  04/11/19 14:39  04/11/19 14:39














- Notes


Notes: 





PHYSICAL EXAM


 


GENERAL: Alert, interacts well. No acute distress.


HEAD: Normocephalic, atraumatic.


EYES: Pupils equal, round, and reactive to light. Extraocular movements intact.


ENT: Oral mucosa moist, tongue midline. 


NECK: Full range of motion. Supple. Trachea midline.


LUNGS: Upper lung fields are rhonchorous with wheezing bilaterally with forced 

expiration. Lower lung fields are clear bilaterally. No respiratory distress.


HEART: Regular rate and rhythm. No murmurs, gallops, or rubs.


ABDOMEN: Soft, non-tender. Non-distended. Bowel sounds present in all 4 

quadrants. No guarding, rigidity, or rebound.


EXTREMITIES: Moves all 4 extremities spontaneously. No edema, radial and 

dorsalis pedis pulses 2/4 bilaterally. No cyanosis.


NEUROLOGICAL: Alert and oriented x3. Normal speech. 


PSYCH: Normal affect, normal mood.


SKIN: Warm, dry, normal turgor. Large thickened toenails.





Course





- Re-evaluation


Re-evalutation: 





04/11/19 17:23


CBC shows pancytopenia consistent with alcoholism, white blood cell count is 

3.4, hemoglobin is 13.3, platelets are quite low at 38.  Patient has no bleeding

at this time, no swollen joints, no petechiae, no altered mental status.  No 

indication for emergent treatment of his thrombocytopenia.  Alcohol level is 

281.  CMP does show liver disease likely related to alcoholism, ammonia is 

normal at 13.1.  After the patient ate he stated that he was ready to go home 

now and would like to be discharged now.  Was able to walk from the ambulance 

into his bed without any difficulty.  No focal neurologic deficits.  No need for

further intervention.  Discharged home.





- Vital Signs


Vital signs: 


                                        











Temp Pulse Resp BP Pulse Ox


 


 98.0 F   85   16   133/65 H  98 


 


 04/11/19 14:39  04/11/19 14:39  04/11/19 14:39  04/11/19 14:39  04/11/19 14:39














- Laboratory


Result Diagrams: 


                                 04/11/19 14:55





                                 04/11/19 14:55


Laboratory results interpreted by me: 


                                        











  04/11/19 04/11/19





  14:55 14:55


 


WBC  3.4 L 


 


RBC  3.96 L 


 


Hgb  13.3 L 


 


MCV  98 H 


 


MCH  33.6 H 


 


Plt Count  38 L 


 


Glucose   133 H


 


Total Bilirubin   2.2 H


 


Direct Bilirubin   1.5 H


 


AST   227 H


 


Alkaline Phosphatase   209 H


 


Albumin   3.4 L














Discharge





- Discharge


Clinical Impression: 


 Difficulty walking, Alcoholism, Alcoholic liver disease, Thrombocytopenia





Hunger


Qualifiers:


 Encounter type: initial encounter Qualified Code(s): T73.0XXA - Starvation, 

initial encounter





Condition: Stable


Disposition: HOME, SELF-CARE


Additional Instructions: 


There are many food pantry as were food is available.  If you are having trouble

locating a food pantry you may call the emergency department and ask us where to

find a food pantry.





We have given you list of resources in the community that will help you to find 

food and will also help you to quit drinking.





I personally performed the services described in the documentation, reviewed and

edited the documentation which was dictated to the scribe in my presence, and it

accurately records my words and actions.

## 2021-08-03 NOTE — ER DOCUMENT REPORT
HPI





- HPI


Time Seen by Provider: 12/24/18 17:02


Pain Level: 5


Notes: 





Patient is a 63-year-old male who presents with chief complaint of left second 

toe pain.  He reports the pain started just a few hours prior to arrival.  

Patient reports he has been drinking a lot of alcohol.  He has two family 

members at the bedside with him.  He reports he thinks he is having a flareup of

his gout.  He denies any other symptoms to include fever.  Denies any trauma to 

the area.











- REPRODUCTIVE


Reproductive: DENIES: Pregnant:





- DERM


Skin Color: Normal





Past Medical History





- General


Information source: Patient





- Social History


Smoking Status: Current Every Day Smoker


Chew tobacco use (# tins/day): No


Frequency of alcohol use: Heavy


Drug Abuse: None


Family History: Reviewed & Not Pertinent


Patient has suicidal ideation: No


Patient has homicidal ideation: No





- Past Medical History


Cardiac Medical History: Reports: Hx Hypertension


Neurological Medical History: Reports: Hx Cerebrovascular Accident


Renal/ Medical History: Denies: Hx Peritoneal Dialysis


GI Medical History: Reports: Hx Hepatitis - [facility]


Musculoskeletal Medical History: Reports Hx Gout


Infectious Medical History: Reports: Hx Hepatitis - [facility]


Past Surgical History: Reports: Hx Kidney (Renal Surgery) - LEFT 

NEPHRECTOMY--DONATED KIDNEY





- Immunizations


Hx Diphtheria, Pertussis, Tetanus Vaccination: No





Vertical Provider Document





- CONSTITUTIONAL


Notes: 





PHYSICAL EXAMINATION:





GENERAL: Well-appearing, well-nourished and in no acute distress.





HEAD: Atraumatic, normocephalic.





EYES: Pupils equal round extraocular movements intact,  conjunctiva are normal.





ENT: Nares patent





NECK: Normal range of motion





LUNGS: No respiratory distress





Musculoskeletal: Normal range of motion





NEUROLOGICAL:  Normal speech, normal gait. 





PSYCH: Normal mood, normal affect.





SKIN: Warm, Dry, normal turgor, no rashes or lesions noted.  Erythema and 

swelling noted to left second digit.  Pain with palpation to left second digit.





- INFECTION CONTROL


TRAVEL OUTSIDE OF THE [place] IN LAST 30 DAYS: No





Course





- Re-evaluation


Re-evalutation: 





Examination as well as history of illness is most consistent with acute gout 

flareup.  Patient will be started on indomethacin.  Patient and family members 

at bedside are agreeable to this plan.








- Vital Signs


Vital signs: 


                                        











Temp Pulse Resp BP Pulse Ox


 


 98.5 F   88   15   150/107 H  98 


 


 12/24/18 16:38  12/24/18 16:38  12/24/18 16:38  12/24/18 16:38  12/24/18 16:38














Discharge





- Discharge


Clinical Impression: 


Gout


Qualifiers:


 Gout site: unspecified site Gout etiology: unspecified cause Chronicity: acute 

Qualified Code(s): M10.9 - Gout, unspecified





Condition: Stable


Disposition: HOME, SELF-CARE


Additional Instructions: 


Gout





     You have been diagnosed as having gout.  Gout is a problem caused by an 

excess of uric acid, a natural chemical found in the body.  The cause of this 

disease is unknown.


     Gout arthritis occurs when crystals of uric acid form in the joints.  The 

big toe is the most common joint involved, but any joint can become affected.


     Persons with gout may also form uric acid kidney stones, resulting in flank

pain and blood in the urine.  Nodules of uric acid may form under the skin.


     The first step of treatment is to decrease the inflammation in the joint 

with antiinflammatory medication.  Medication to lower the uric acid level in 

the blood may then be prescribed.  This medication should be taken regularly, as

any sudden change in dosage may provoke an attack of gout.  Some foods, such as 

red meat, can provoke an attack in some gout sufferers.


     Call the doctor if new symptoms arise, or if you do not improve.





Gout Diet





     Changing your diet can decrease the uric acid in your blood. High levels of

uric acid cause gouty arthritis and uric acid kidney stones. If you have gout, 

you should avoid meats that are high in purine. Meat products to avoid include 

liver, kidneys, and brains. In general, poultry is better than red meats. 

Seafoods to avoid include anchovies, sardines, herring, mackerel, and scallops.


     In addition to limiting purine-rich foods, people with gout should limit 

protein intake to 10-15% of total calories. Carbohydrate intake should be around

50% of total daily calories. Limit fat intake to 30% of total daily calories. 

Cholesterol intake should be less than 300 mg/day.


     Maintain or achieve a healthy body weight. Weight loss should be gradual. 

Rapid weight loss can actually increase uric acid levels temporarily. Alcohol, 

especially beer, should be avoided.


     Get plenty of fluids. This dilutes urinary uric acid, and helps prevent 

uric acid kidney stones. Drink eight to twelve cups of water daily.








Prescriptions: 


Indomethacin [Indocin 50 mg Capsule] 50 mg PO TID #30 capsule
English